# Patient Record
Sex: FEMALE | Race: BLACK OR AFRICAN AMERICAN | NOT HISPANIC OR LATINO | Employment: UNEMPLOYED | URBAN - METROPOLITAN AREA
[De-identification: names, ages, dates, MRNs, and addresses within clinical notes are randomized per-mention and may not be internally consistent; named-entity substitution may affect disease eponyms.]

---

## 2017-05-12 ENCOUNTER — HISTORICAL (OUTPATIENT)
Dept: RADIOLOGY | Facility: HOSPITAL | Age: 67
End: 2017-05-12

## 2018-07-01 ENCOUNTER — HISTORICAL (OUTPATIENT)
Dept: URGENT CARE | Facility: CLINIC | Age: 68
End: 2018-07-01

## 2018-08-17 ENCOUNTER — HISTORICAL (OUTPATIENT)
Dept: LAB | Facility: HOSPITAL | Age: 68
End: 2018-08-17

## 2018-09-05 ENCOUNTER — HISTORICAL (OUTPATIENT)
Dept: RADIOLOGY | Facility: HOSPITAL | Age: 68
End: 2018-09-05

## 2018-10-05 ENCOUNTER — HISTORICAL (OUTPATIENT)
Dept: RADIOLOGY | Facility: HOSPITAL | Age: 68
End: 2018-10-05

## 2019-10-22 ENCOUNTER — HISTORICAL (OUTPATIENT)
Dept: RADIOLOGY | Facility: HOSPITAL | Age: 69
End: 2019-10-22

## 2020-10-23 ENCOUNTER — HISTORICAL (OUTPATIENT)
Dept: RADIOLOGY | Facility: HOSPITAL | Age: 70
End: 2020-10-23

## 2021-03-15 ENCOUNTER — HISTORICAL (OUTPATIENT)
Dept: ENDOCRINOLOGY | Facility: CLINIC | Age: 71
End: 2021-03-15

## 2021-04-14 ENCOUNTER — HISTORICAL (OUTPATIENT)
Dept: RADIOLOGY | Facility: HOSPITAL | Age: 71
End: 2021-04-14

## 2021-04-28 ENCOUNTER — HISTORICAL (OUTPATIENT)
Dept: ENDOCRINOLOGY | Facility: CLINIC | Age: 71
End: 2021-04-28

## 2021-09-08 ENCOUNTER — HISTORICAL (OUTPATIENT)
Dept: ADMINISTRATIVE | Facility: HOSPITAL | Age: 71
End: 2021-09-08

## 2021-10-12 ENCOUNTER — HISTORICAL (OUTPATIENT)
Dept: ENDOCRINOLOGY | Facility: CLINIC | Age: 71
End: 2021-10-12

## 2021-10-12 LAB
T4 FREE SERPL-MCNC: 1.18 NG/DL (ref 0.7–1.48)
TSH SERPL-ACNC: 0.02 UIU/ML (ref 0.35–4.94)

## 2021-10-25 ENCOUNTER — HISTORICAL (OUTPATIENT)
Dept: RADIOLOGY | Facility: HOSPITAL | Age: 71
End: 2021-10-25

## 2021-11-09 ENCOUNTER — HISTORICAL (OUTPATIENT)
Dept: ADMINISTRATIVE | Facility: HOSPITAL | Age: 71
End: 2021-11-09

## 2021-11-09 LAB
T4 FREE SERPL-MCNC: 1.15 NG/DL (ref 0.7–1.48)
TSH SERPL-ACNC: 0.02 UIU/ML (ref 0.35–4.94)

## 2021-12-21 ENCOUNTER — HISTORICAL (OUTPATIENT)
Dept: LAB | Facility: HOSPITAL | Age: 71
End: 2021-12-21

## 2021-12-21 LAB
T3FREE SERPL-MCNC: 2.94 PG/ML (ref 1.71–3.71)
T4 FREE SERPL-MCNC: 1.26 NG/DL (ref 0.7–1.48)
TSH SERPL-ACNC: 0.05 UIU/ML (ref 0.35–4.94)

## 2022-01-13 ENCOUNTER — HISTORICAL (OUTPATIENT)
Dept: RADIOLOGY | Facility: HOSPITAL | Age: 72
End: 2022-01-13

## 2022-01-18 ENCOUNTER — HISTORICAL (OUTPATIENT)
Dept: ENDOCRINOLOGY | Facility: CLINIC | Age: 72
End: 2022-01-18

## 2022-04-07 ENCOUNTER — HISTORICAL (OUTPATIENT)
Dept: ADMINISTRATIVE | Facility: HOSPITAL | Age: 72
End: 2022-04-07
Payer: MEDICAID

## 2022-04-15 ENCOUNTER — HISTORICAL (OUTPATIENT)
Dept: ADMINISTRATIVE | Facility: HOSPITAL | Age: 72
End: 2022-04-15

## 2022-04-24 VITALS
HEIGHT: 66 IN | BODY MASS INDEX: 30.44 KG/M2 | SYSTOLIC BLOOD PRESSURE: 131 MMHG | WEIGHT: 189.38 LBS | OXYGEN SATURATION: 97 % | DIASTOLIC BLOOD PRESSURE: 73 MMHG

## 2022-04-28 ENCOUNTER — HISTORICAL (OUTPATIENT)
Dept: ADMINISTRATIVE | Facility: HOSPITAL | Age: 72
End: 2022-04-28
Payer: MEDICAID

## 2022-04-28 ENCOUNTER — HISTORICAL (OUTPATIENT)
Dept: RADIOLOGY | Facility: HOSPITAL | Age: 72
End: 2022-04-28
Payer: MEDICAID

## 2022-05-19 DIAGNOSIS — M94.0 TIETZE'S DISEASE: Primary | ICD-10-CM

## 2022-05-19 DIAGNOSIS — E78.2 MIXED HYPERLIPIDEMIA: ICD-10-CM

## 2022-05-19 DIAGNOSIS — E05.90 HYPERTHYROIDISM: ICD-10-CM

## 2022-05-19 DIAGNOSIS — I10 HYPERTENSION: ICD-10-CM

## 2022-05-20 ENCOUNTER — HOSPITAL ENCOUNTER (EMERGENCY)
Facility: HOSPITAL | Age: 72
Discharge: HOME | End: 2022-05-20
Attending: INTERNAL MEDICINE
Payer: MEDICARE

## 2022-05-20 VITALS
TEMPERATURE: 98 F | HEART RATE: 70 BPM | RESPIRATION RATE: 16 BRPM | BODY MASS INDEX: 31.32 KG/M2 | DIASTOLIC BLOOD PRESSURE: 70 MMHG | WEIGHT: 194.88 LBS | SYSTOLIC BLOOD PRESSURE: 170 MMHG | HEIGHT: 66 IN | OXYGEN SATURATION: 100 %

## 2022-05-20 DIAGNOSIS — M54.9 MID BACK PAIN ON LEFT SIDE: Primary | ICD-10-CM

## 2022-05-20 PROCEDURE — 99284 EMERGENCY DEPT VISIT MOD MDM: CPT | Mod: 25

## 2022-05-20 RX ORDER — BACLOFEN 10 MG/1
10 TABLET ORAL 3 TIMES DAILY
Qty: 30 TABLET | Refills: 0 | Status: SHIPPED | OUTPATIENT
Start: 2022-05-20 | End: 2022-05-30

## 2022-05-20 RX ORDER — METHYLPREDNISOLONE 4 MG/1
TABLET ORAL
Qty: 1 EACH | Refills: 0 | Status: SHIPPED | OUTPATIENT
Start: 2022-05-20 | End: 2024-01-29

## 2022-05-20 NOTE — DISCHARGE INSTRUCTIONS
Keep appointment for your scheduled CT scan of your chest on Wednesday of next week.    Follow up with Dr. Stark

## 2022-05-20 NOTE — ED PROVIDER NOTES
Encounter Date: 5/20/2022       History     Chief Complaint   Patient presents with    Back Pain     Left posterior lower lateral thoracic pain x 2 months. Seen by PCP and undergoing workup. Having increased pain, untolerable.     71 YO AAF in ER with complaints of left sided mid back and rib pain X 2 months. Has been getting worked up by her PCP Dr. Anaya and has a CT chest scheduled for Wednesday of next week. States her current pain medications are not really helping. She is currently taking Norco 10 mg and Aleve OTC. States muscle relaxers helped when she was prescribed them around Easter time. Getting up from a sitting position and sneezing makes the pain worse. She has had chest xray and rib xrays which are all unremarkable per pt. Denies injury/trauma, fever, chills, chest pain, SOB, abdominal pain, N/V/D, HA or dizziness. No other complaints.    The history is provided by the patient.     Review of patient's allergies indicates:  No Known Allergies  Past Medical History:   Diagnosis Date    Hypertension     Thyroid disease      History reviewed. No pertinent surgical history.  History reviewed. No pertinent family history.  Social History     Tobacco Use    Smoking status: Never Smoker    Smokeless tobacco: Never Used     Review of Systems   Constitutional: Negative for chills and fever.   HENT: Negative for congestion and sore throat.    Respiratory: Negative for cough and shortness of breath.    Cardiovascular: Negative for chest pain.   Gastrointestinal: Negative for abdominal pain, diarrhea, nausea and vomiting.   Genitourinary: Negative for dysuria.   Musculoskeletal: Positive for back pain.   Skin: Negative for rash.   Neurological: Negative for dizziness, weakness, light-headedness and headaches.   Hematological: Does not bruise/bleed easily.       Physical Exam     Initial Vitals [05/20/22 1714]   BP Pulse Resp Temp SpO2   (!) 182/72 67 16 98.1 °F (36.7 °C) 100 %      MAP       --          Physical Exam    Nursing note and vitals reviewed.  Constitutional: She appears well-developed and well-nourished. No distress.   HENT:   Head: Normocephalic and atraumatic.   Eyes: Conjunctivae are normal.   Neck: Neck supple.   Normal range of motion.  Cardiovascular: Normal rate and regular rhythm.   Pulmonary/Chest: Breath sounds normal. She exhibits tenderness (ttp left lateral chest wall and posteriorly at mid thoracic area).   Musculoskeletal:      Cervical back: Normal range of motion and neck supple.     Neurological: She is alert and oriented to person, place, and time.   Skin: Skin is warm and dry.   Psychiatric: She has a normal mood and affect.         ED Course   Procedures  Labs Reviewed - No data to display       Imaging Results    None          Medications - No data to display                     1825  VSS, NAD, pt is non-toxic or ill appearing,  treatment plan and discharge instructions including follow up discussed, pt verbalized understanding, all questions answered, pt is stable and ready for discharge  Clinical Impression:   Final diagnoses:  [M54.9] Mid back pain on left side (Primary)          ED Disposition Condition    Discharge Stable        ED Prescriptions     Medication Sig Dispense Start Date End Date Auth. Provider    baclofen (LIORESAL) 10 MG tablet Take 1 tablet (10 mg total) by mouth 3 (three) times daily. for 10 days 30 tablet 5/20/2022 5/30/2022 MARYANNE Hamilton    methylPREDNISolone (MEDROL DOSEPACK) 4 mg tablet Take as package instructs 1 each 5/20/2022  MARYANNE Hamilton        Follow-up Information     Follow up With Specialties Details Why Contact Info    Chepe Anaya Sr., MD Internal Medicine Schedule an appointment as soon as possible for a visit  for CT scan follow up [address] [facility] [address]  [phone]      [facility] - Emergency Dept Emergency Medicine In 3 days As needed, If symptoms worsen [address] [facility]  [address]  [phone]           MARYANNE Hamilton  05/20/22 1835

## 2022-05-30 ENCOUNTER — HOSPITAL ENCOUNTER (EMERGENCY)
Facility: HOSPITAL | Age: 72
Discharge: HOME | End: 2022-05-30
Attending: STUDENT IN AN ORGANIZED HEALTH CARE EDUCATION/TRAINING PROGRAM
Payer: MEDICARE

## 2022-05-30 VITALS
RESPIRATION RATE: 16 BRPM | DIASTOLIC BLOOD PRESSURE: 76 MMHG | SYSTOLIC BLOOD PRESSURE: 132 MMHG | BODY MASS INDEX: 31.43 KG/M2 | TEMPERATURE: 98 F | WEIGHT: 195.56 LBS | HEART RATE: 72 BPM | OXYGEN SATURATION: 100 % | HEIGHT: 66 IN

## 2022-05-30 DIAGNOSIS — M54.9 BACK PAIN: ICD-10-CM

## 2022-05-30 LAB
ALBUMIN SERPL-MCNC: 4 GM/DL (ref 3.4–4.8)
ALBUMIN/GLOB SERPL: 1.1 RATIO (ref 1.1–2)
ALP SERPL-CCNC: 74 UNIT/L (ref 40–150)
ALT SERPL-CCNC: 21 UNIT/L (ref 0–55)
AST SERPL-CCNC: 19 UNIT/L (ref 5–34)
BASOPHILS # BLD AUTO: 0.09 X10(3)/MCL (ref 0–0.2)
BASOPHILS NFR BLD AUTO: 0.8 %
BILIRUBIN DIRECT+TOT PNL SERPL-MCNC: 0.3 MG/DL
BUN SERPL-MCNC: 21.2 MG/DL (ref 9.8–20.1)
CALCIUM SERPL-MCNC: 10.2 MG/DL (ref 8.4–10.2)
CHLORIDE SERPL-SCNC: 105 MMOL/L (ref 98–107)
CO2 SERPL-SCNC: 31 MMOL/L (ref 23–31)
CREAT SERPL-MCNC: 1.1 MG/DL (ref 0.55–1.02)
EOSINOPHIL # BLD AUTO: 0.32 X10(3)/MCL (ref 0–0.9)
EOSINOPHIL NFR BLD AUTO: 2.9 %
ERYTHROCYTE [DISTWIDTH] IN BLOOD BY AUTOMATED COUNT: 14.7 % (ref 11.5–17)
GLOBULIN SER-MCNC: 3.7 GM/DL (ref 2.4–3.5)
GLUCOSE SERPL-MCNC: 120 MG/DL (ref 82–115)
HCT VFR BLD AUTO: 42.5 % (ref 37–47)
HGB BLD-MCNC: 13.2 GM/DL (ref 12–16)
IMM GRANULOCYTES # BLD AUTO: 0.09 X10(3)/MCL (ref 0–0.02)
IMM GRANULOCYTES NFR BLD AUTO: 0.8 % (ref 0–0.43)
LYMPHOCYTES # BLD AUTO: 3.93 X10(3)/MCL (ref 0.6–4.6)
LYMPHOCYTES NFR BLD AUTO: 35.7 %
MCH RBC QN AUTO: 27.5 PG (ref 27–31)
MCHC RBC AUTO-ENTMCNC: 31.1 MG/DL (ref 33–36)
MCV RBC AUTO: 88.5 FL (ref 80–94)
MONOCYTES # BLD AUTO: 0.96 X10(3)/MCL (ref 0.1–1.3)
MONOCYTES NFR BLD AUTO: 8.7 %
NEUTROPHILS # BLD AUTO: 5.6 X10(3)/MCL (ref 2.1–9.2)
NEUTROPHILS NFR BLD AUTO: 51.1 %
NRBC BLD AUTO-RTO: 0 %
PLATELET # BLD AUTO: 270 X10(3)/MCL (ref 130–400)
PMV BLD AUTO: 10.9 FL (ref 9.4–12.4)
POTASSIUM SERPL-SCNC: 3.8 MMOL/L (ref 3.5–5.1)
PROT SERPL-MCNC: 7.7 GM/DL (ref 5.8–7.6)
RBC # BLD AUTO: 4.8 X10(6)/MCL (ref 4.2–5.4)
SODIUM SERPL-SCNC: 144 MMOL/L (ref 136–145)
TROPONIN I SERPL-MCNC: <0.01 NG/ML (ref 0–0.04)
WBC # SPEC AUTO: 11 X10(3)/MCL (ref 4.5–11.5)

## 2022-05-30 PROCEDURE — 25000003 PHARM REV CODE 250: Performed by: PHYSICIAN ASSISTANT

## 2022-05-30 PROCEDURE — 63600175 PHARM REV CODE 636 W HCPCS: Performed by: PHYSICIAN ASSISTANT

## 2022-05-30 PROCEDURE — 96372 THER/PROPH/DIAG INJ SC/IM: CPT | Performed by: PHYSICIAN ASSISTANT

## 2022-05-30 PROCEDURE — 36415 COLL VENOUS BLD VENIPUNCTURE: CPT | Performed by: PHYSICIAN ASSISTANT

## 2022-05-30 PROCEDURE — 85025 COMPLETE CBC W/AUTO DIFF WBC: CPT | Performed by: PHYSICIAN ASSISTANT

## 2022-05-30 PROCEDURE — 80053 COMPREHEN METABOLIC PANEL: CPT | Performed by: PHYSICIAN ASSISTANT

## 2022-05-30 PROCEDURE — 93005 ELECTROCARDIOGRAM TRACING: CPT

## 2022-05-30 PROCEDURE — 99285 EMERGENCY DEPT VISIT HI MDM: CPT | Mod: 25

## 2022-05-30 PROCEDURE — 84484 ASSAY OF TROPONIN QUANT: CPT | Performed by: PHYSICIAN ASSISTANT

## 2022-05-30 RX ORDER — HYDROCODONE BITARTRATE AND ACETAMINOPHEN 5; 325 MG/1; MG/1
1 TABLET ORAL
Status: COMPLETED | OUTPATIENT
Start: 2022-05-30 | End: 2022-05-30

## 2022-05-30 RX ORDER — KETOROLAC TROMETHAMINE 30 MG/ML
15 INJECTION, SOLUTION INTRAMUSCULAR; INTRAVENOUS
Status: COMPLETED | OUTPATIENT
Start: 2022-05-30 | End: 2022-05-30

## 2022-05-30 RX ORDER — HYDROCODONE BITARTRATE AND ACETAMINOPHEN 5; 325 MG/1; MG/1
1 TABLET ORAL EVERY 8 HOURS PRN
Qty: 9 TABLET | Refills: 0 | Status: SHIPPED | OUTPATIENT
Start: 2022-05-30 | End: 2022-06-02

## 2022-05-30 RX ORDER — ACYCLOVIR 800 MG/1
800 TABLET ORAL
Qty: 50 TABLET | Refills: 0 | Status: SHIPPED | OUTPATIENT
Start: 2022-05-30 | End: 2022-06-09

## 2022-05-30 RX ADMIN — KETOROLAC TROMETHAMINE 15 MG: 30 INJECTION, SOLUTION INTRAMUSCULAR at 07:05

## 2022-05-30 RX ADMIN — HYDROCODONE BITARTRATE AND ACETAMINOPHEN 1 TABLET: 5; 325 TABLET ORAL at 06:05

## 2022-05-30 NOTE — ED PROVIDER NOTES
"Encounter Date: 5/30/2022       History     Chief Complaint   Patient presents with    Muscle Pain     Left-sided (lateral) pain (x)2 months. Also states the pain is "worse" with movement. Vss. nadn     Patient reports left lateral thoracic back pain; pt reports pain is worse with certain movements, coughing, as well as sneezing and has been present for nearly x2 months; pt reports the pain does not move and she denies sob      Chest Pain  The current episode started several weeks ago. Duration of episode(s) is 2 months. Chest pain occurs intermittently. The chest pain is unchanged. At its most intense, the chest pain is at 4/10. The chest pain is currently at 3/10. The quality of the pain is described as similar to previous episodes, aching and dull. The pain does not radiate. Exacerbated by: coughing, sneezing, palpation. Pertinent negatives for primary symptoms include no fever, no shortness of breath, no palpitations, no nausea, no dizziness and no altered mental status.   Pertinent negatives for associated symptoms include no claudication and no weakness. She tried NSAIDs and medication for the symptoms. There are no known risk factors.   Her past medical history is significant for hypertension.   Pertinent negatives for family medical history include: no PE, no stroke and no TIA.     Review of patient's allergies indicates:  No Known Allergies  Past Medical History:   Diagnosis Date    Hypertension     Thyroid disease      No past surgical history on file.  No family history on file.  Social History     Tobacco Use    Smoking status: Never Smoker    Smokeless tobacco: Never Used     Review of Systems   Constitutional: Negative for fever.   HENT: Negative for sore throat.    Eyes: Negative.    Respiratory: Negative for shortness of breath.    Cardiovascular: Negative for palpitations, claudication and leg swelling.   Gastrointestinal: Negative for nausea.   Genitourinary: Negative for dysuria. "   Musculoskeletal: Positive for back pain. Negative for neck pain.   Skin: Negative.  Negative for rash.   Neurological: Negative for dizziness and weakness.   Hematological: Does not bruise/bleed easily.   Psychiatric/Behavioral: Negative.        Physical Exam     Initial Vitals   BP Pulse Resp Temp SpO2   05/30/22 1810 05/30/22 1810 05/30/22 1810 05/30/22 1810 05/30/22 2042   (!) 169/83 76 18 98.4 °F (36.9 °C) 100 %      MAP       --                Physical Exam    Nursing note and vitals reviewed.  Constitutional: She appears well-developed and well-nourished.   HENT:   Head: Normocephalic and atraumatic.   Eyes: EOM are normal.   Neck: Neck supple.   Normal range of motion.  Cardiovascular: Normal rate and regular rhythm.   Pulmonary/Chest: Effort normal and breath sounds normal. No tachypnea.         She exhibits tenderness. She exhibits no mass, no crepitus, no deformity and no swelling.   Pain reproducible with palpation; point tenderness present; no pain anywhere else other than noted; no brusing, redness, or warmth appreciated   Abdominal: Abdomen is soft. Bowel sounds are normal.   Musculoskeletal:         General: Normal range of motion.      Cervical back: Normal range of motion and neck supple.     Neurological: She is alert and oriented to person, place, and time.   Skin: Skin is warm and dry.   Psychiatric: She has a normal mood and affect. Her behavior is normal. Judgment and thought content normal.         ED Course   Procedures  Labs Reviewed   COMPREHENSIVE METABOLIC PANEL - Abnormal; Notable for the following components:       Result Value    Glucose Level 120 (*)     Blood Urea Nitrogen 21.2 (*)     Creatinine 1.10 (*)     Protein Total 7.7 (*)     Globulin 3.7 (*)     All other components within normal limits   CBC WITH DIFFERENTIAL - Abnormal; Notable for the following components:    MCHC 31.1 (*)     IG# 0.09 (*)     IG% 0.8 (*)     All other components within normal limits   TROPONIN I -  Normal   CBC W/ AUTO DIFFERENTIAL    Narrative:     The following orders were created for panel order CBC auto differential.  Procedure                               Abnormality         Status                     ---------                               -----------         ------                     CBC with Differential[[id number]]        Abnormal            Final result                 Please view results for these tests on the individual orders.   EXTRA TUBES    Narrative:     The following orders were created for panel order EXTRA TUBES.  Procedure                               Abnormality         Status                     ---------                               -----------         ------                     Light Blue Top Hold[[id number]]                              In process                 Gold Top Hold[[id number]]                                    In process                   Please view results for these tests on the individual orders.   LIGHT BLUE TOP HOLD   GOLD TOP HOLD          Imaging Results          CT Chest Without Contrast (Final result)  Result time 05/30/22 19:51:57    Final result by Mark Siddiqui MD (05/30/22 19:51:57)                 Impression:      No acute abnormality of the chest.      Electronically signed by: Mark Siddiqui MD  Date:    05/30/2022  Time:    19:51             Narrative:    EXAMINATION:  CT CHEST WITHOUT CONTRAST    CLINICAL HISTORY:  thoracic left lateral chest/back pain; tender to palpation;    TECHNIQUE:  Axial images of the chest were obtained without IV contrast administration.  Coronal and sagittal reconstructions were provided.  Three dimensional and MIP images were obtained and evaluated.  Total DLP was 224 mGy-cm. Dose lowering technique and automated exposure control were utilized for this exam.    COMPARISON:  Chest radiograph 04/28/2022.    FINDINGS:  The airway is widely patent.  There is no mediastinal or hilar lymphadenopathy.  The heart is normal in size.  The  lung parenchyma is clear.  There is no pulmonary nodule or mass.  There is no pleural effusion.  There is no ground-glass or reticulonodular airspace opacity.  The upper abdomen demonstrates no acute abnormality.  There is no lytic or blastic osseous lesion.                                 Medications   ketorolac injection 15 mg (15 mg Intramuscular Given 5/30/22 1900)   HYDROcodone-acetaminophen 5-325 mg per tablet 1 tablet (1 tablet Oral Given 5/30/22 1859)           APC / Resident Notes:   I was not physically present during the history or exam. I was available at all times for consultation. (Zmora)        ED Course as of 05/31/22 0510   Mon May 30, 2022   1903 Patient transitioned to Paradise Riddle [AL]   1905 I, Paradise Riddle PA-C, assumed care of patient at this time. Awaiting lab results and CT scan.  [VJ]   2026 Discussed lab results, imaging, lab results, diagnosis, and treatment plan with her. At this point, since her symptoms have been for 2 months and she has been treated twice for musculoskeletal strain, I will treat for uncomplicated zoster with acyclovir. I will also give a short course of pain medication. She verbalized understanding. Strict return precautions given. Stable for discharge.  [VJ]      ED Course User Index  [AL] MARYANNE Dc  [VJ] Paradise Riddle PA-C             Clinical Impression:   Final diagnoses:  [M54.9] Back pain          ED Disposition Condition    Discharge Stable        ED Prescriptions     Medication Sig Dispense Start Date End Date Auth. Provider    HYDROcodone-acetaminophen (NORCO) 5-325 mg per tablet Take 1 tablet by mouth every 8 (eight) hours as needed for Pain. 9 tablet 5/30/2022 6/2/2022 Paradise Riddle PA-C    acyclovir (ZOVIRAX) 800 MG Tab Take 1 tablet (800 mg total) by mouth 5 (five) times daily. for 10 days 50 tablet 5/30/2022 6/9/2022 Paradise Riddle PA-C        Follow-up Information     Follow up With Specialties Details Why  Contact Info    [facility] - Emergency Dept Emergency Medicine In 3 days As needed, If symptoms worsen [address] [facility] [address] [phone]    Chepe Anaya Sr., MD Internal Medicine   [address] [address]  [phone]             Paradise Riddle PA-C  05/30/22 2031       Luis Jimenez MD  05/31/22 8257

## 2022-07-08 ENCOUNTER — TELEPHONE (OUTPATIENT)
Dept: ENDOCRINOLOGY | Facility: CLINIC | Age: 72
End: 2022-07-08
Payer: MEDICARE

## 2022-07-08 RX ORDER — METHIMAZOLE 10 MG/1
10 TABLET ORAL DAILY
COMMUNITY
Start: 2022-04-25 | End: 2022-07-19 | Stop reason: SDUPTHER

## 2022-07-08 NOTE — TELEPHONE ENCOUNTER
"Spoke with patient, she states her medication bottle from [facility] states "take Methimazole every 8 hours". I explained that we did not prescribe this medication that way and to call the pharmacy to clarify the directions, as we only want her to take her dose daily. Pt agrees and verbalizes understanding.   "

## 2022-07-08 NOTE — TELEPHONE ENCOUNTER
----- Message from Gela Juarez sent at 7/8/2022  8:36 AM CDT -----  Regarding: [facility] ENDO: Med mgmt  STOUT PT       Pt called stating that she picked up a medication from the pharmacy yesterday and she is unfamiliar with it. Luis Guerrero filled the medication and it states to take one every 8 hours. Methimazole 10 mg tablets.   Please call the patient @ [phone]

## 2022-07-11 ENCOUNTER — TELEPHONE (OUTPATIENT)
Dept: ENDOCRINOLOGY | Facility: CLINIC | Age: 72
End: 2022-07-11
Payer: MEDICARE

## 2022-07-11 DIAGNOSIS — E05.90 HYPERTHYROIDISM: Primary | ICD-10-CM

## 2022-07-11 NOTE — TELEPHONE ENCOUNTER
----- Message from Rosanna Hoffman sent at 7/11/2022  7:48 AM CDT -----  Regarding: Pt called  #DR STOUT#    Pt called for lab orders her appt is 7/19; Pt states that she will come this Wed 7/13 to do the labs.      [phone] Pt

## 2022-07-13 ENCOUNTER — LAB VISIT (OUTPATIENT)
Dept: LAB | Facility: HOSPITAL | Age: 72
End: 2022-07-13
Attending: INTERNAL MEDICINE
Payer: MEDICARE

## 2022-07-13 DIAGNOSIS — E05.90 HYPERTHYROIDISM: ICD-10-CM

## 2022-07-13 LAB
T3FREE SERPL-MCNC: 2.44 PG/ML (ref 1.57–3.91)
T4 FREE SERPL-MCNC: 0.75 NG/DL (ref 0.7–1.48)
TSH SERPL-ACNC: 5.88 UIU/ML (ref 0.35–4.94)

## 2022-07-13 PROCEDURE — 84481 FREE ASSAY (FT-3): CPT

## 2022-07-13 PROCEDURE — 84439 ASSAY OF FREE THYROXINE: CPT

## 2022-07-13 PROCEDURE — 84443 ASSAY THYROID STIM HORMONE: CPT

## 2022-07-13 PROCEDURE — 36415 COLL VENOUS BLD VENIPUNCTURE: CPT

## 2022-07-19 ENCOUNTER — OFFICE VISIT (OUTPATIENT)
Dept: ENDOCRINOLOGY | Facility: CLINIC | Age: 72
End: 2022-07-19
Payer: MEDICARE

## 2022-07-19 VITALS
HEIGHT: 62 IN | HEART RATE: 67 BPM | SYSTOLIC BLOOD PRESSURE: 122 MMHG | DIASTOLIC BLOOD PRESSURE: 79 MMHG | TEMPERATURE: 98 F | BODY MASS INDEX: 36.18 KG/M2 | WEIGHT: 196.63 LBS | RESPIRATION RATE: 18 BRPM

## 2022-07-19 DIAGNOSIS — E04.2 MULTINODULAR GOITER: ICD-10-CM

## 2022-07-19 DIAGNOSIS — E05.00 GRAVES DISEASE: Primary | ICD-10-CM

## 2022-07-19 PROCEDURE — 99214 OFFICE O/P EST MOD 30 MIN: CPT | Mod: PBBFAC

## 2022-07-19 RX ORDER — DORZOLAMIDE HYDROCHLORIDE AND TIMOLOL MALEATE 20; 5 MG/ML; MG/ML
SOLUTION/ DROPS OPHTHALMIC
COMMUNITY
Start: 2022-07-10

## 2022-07-19 RX ORDER — ASPIRIN 81 MG/1
81 TABLET ORAL DAILY
COMMUNITY

## 2022-07-19 RX ORDER — METHIMAZOLE 10 MG/1
TABLET ORAL
Qty: 45 TABLET | Refills: 1 | Status: SHIPPED | OUTPATIENT
Start: 2022-07-19 | End: 2023-02-14

## 2022-07-19 RX ORDER — PRAVASTATIN SODIUM 40 MG/1
40 TABLET ORAL DAILY
COMMUNITY
Start: 2022-07-09

## 2022-07-19 RX ORDER — METHOCARBAMOL 500 MG/1
500 TABLET, FILM COATED ORAL 2 TIMES DAILY
COMMUNITY
Start: 2022-06-15 | End: 2024-01-29

## 2022-07-19 RX ORDER — AMLODIPINE BESYLATE 5 MG/1
5 TABLET ORAL DAILY
COMMUNITY
Start: 2022-07-02

## 2022-07-19 RX ORDER — ALLOPURINOL 100 MG/1
100 TABLET ORAL DAILY
COMMUNITY
Start: 2022-07-06

## 2022-07-19 RX ORDER — AZILSARTAN KAMEDOXOMIL AND CHLORTHALIDONE 40; 12.5 MG/1; MG/1
1 TABLET ORAL DAILY
COMMUNITY
Start: 2022-07-07

## 2022-07-19 NOTE — PROGRESS NOTES
[facility]    Subjective:      This is a 71-year-old female with history of multinodular goiter s/p left hemithyroidectomy with subsequent Graves' disease who presents to [facility] endocrine clinic for a routine follow-up. Patient was last seen on January 21, 2022.  At this time, she was on methimazole 7.5 mg daily and her labs on 1/18/2021 showed TSH of 0.0581 and free T4 1.12 .Currently on methimazole 10 mg daily and labs on 7/13/22 showed TSH 5.8 and free T4 0.75. She has been compliant with methimazole and propranolol and reports no side effects      Today, denies eye bulging, dry eyes, chest pain, palpitations, unexplained weight loss and heat intolerance.        Review of Systems:  10 point ROS negative except for HPI    Objective:   Vital Signs:  Vitals:    07/19/22 0848   BP: 122/79   Pulse:    Resp:    Temp:         Body mass index is 35.51 kg/m².     General:  Well developed, well nourished, no acute respiratory distress  Head: Normocephalic, atraumatic  Eyes: PERRL, EOMI, anicteric sclera  Throat: No posterior pharyngeal erythema or exudate, no tonsillar exudate  Neck: supple, normal ROM, no JVD  CVS:  RRR, S1 and S2 normal, no murmurs, no added heart sounds, rubs, gallops, regular peripheral pulses, and no peripheral edema  Resp:  Lungs clear to auscultation bilaterally, no wheezes, rales, or rhonci  GI:  Abdomen soft, non-tender, non-distended, normoactive bowel sounds  MSK:  Full range of motion, no obvious deformities   Skin:  No rashes, ulcers, erythema  Neuro:  Alert and oriented x3, No focal neuro deficits, CNII-XII grossly intact  Psych:  Appropriate mood and affect         Laboratory:  Lab Results   Component Value Date    WBC 11.0 05/30/2022    HGB 13.2 05/30/2022    HCT 42.5 05/30/2022     05/30/2022    MCV 88.5 05/30/2022    RDW 14.7 05/30/2022     Lab Results   Component Value Date    CREATININE 1.10 (H) 05/30/2022    Lab Results   Component Value Date     05/30/2022    K 3.8  05/30/2022    CO2 31 05/30/2022    BUN 21.2 (H) 05/30/2022    CREATININE 1.10 (H) 05/30/2022    CALCIUM 10.2 05/30/2022     Lab Results   Component Value Date    TSH 5.8822 (H) 07/13/2022    [id number] 0.75 07/13/2022      Free t3- 2.44     Anemia: No results found for: IRON, TIBC, FERRITIN, LVGCPVGT99, FOLATE    Current Medications:  Current Outpatient Medications   Medication Instructions    acyclovir (ZOVIRAX) 800 mg, Oral, 5 times daily    allopurinoL (ZYLOPRIM) 100 mg, Oral, Daily    amLODIPine (NORVASC) 5 mg, Oral, Daily    aspirin (ECOTRIN) 81 mg, Oral, Daily    baclofen (LIORESAL) 10 mg, Oral, 3 times daily    dorzolamide-timolol 2-0.5% (COSOPT) 22.3-6.8 mg/mL ophthalmic solution Both Eyes    EDARBYCLOR 40-12.5 mg Tab 1 tablet, Oral, Daily    methIMAzole (TAPAZOLE) 10 mg, Oral, Daily    methocarbamoL (ROBAXIN) 500 mg, Oral, 2 times daily    methylPREDNISolone (MEDROL DOSEPACK) 4 mg tablet Take as package instructs    pravastatin (PRAVACHOL) 40 mg, Oral, Daily    propranoloL (INDERAL LA) 60 MG 24 hr capsule TAKE 1 CAPSULE BY MOUTH DAILY        Assessment and Plan:        Health Maintenance   Topic Date Due    Hepatitis C Screening  Never done    Lipid Panel  Never done    TETANUS VACCINE  Never done    DEXA Scan  Never done    Mammogram  10/25/2022          Hyperthyroidism E05.90 -Currently on 10 mg of methimazole and propanol 60 mg daily   - Last visit increased methimazole from 7.5 mg to 10 mg given her lab values, age, and risk for cardiac consequences. She is doing well with this change. Reports no adverse effects.   - Pt remains asymptomatic, and TSH has improved although slightly elevated. Most recent labs show TSH 7/13/22 5.8 and free T4 0.75. Denies dry eyes, chest pain, palpitations, weight loss, eye bulging, fever or chills   - Will decrease methimazole to 5 mg daily   - Discussed trial of holding propanolol 60 mg and restarting if needed with patient     Graves Disease   -Patient  does have confirmed Graves Disease given positive TSI antibody. She has responded well to higher dose methimazole.   - Will decrease methimazole dose today and f/u TSH at next visit.  - Discussed with her a complete thyroidectomy or radiation therapy.  - Thyroid nodule E04.1 -Repeat ultrasound of the thyroid 01/13/2022 shows a stable right thyroid nodule of 8 mm x 4 mm x 7 mm.  -We will continue to monitor        RTC in 3 months, f/u TSH           Manjula Snyder MD

## 2022-07-29 RX ORDER — METHIMAZOLE 10 MG/1
TABLET ORAL
Qty: 90 TABLET | OUTPATIENT
Start: 2022-07-29

## 2022-09-12 DIAGNOSIS — M54.31 RIGHT SIDED SCIATICA: Primary | ICD-10-CM

## 2022-09-12 DIAGNOSIS — S39.012A LUMBAR STRAIN: ICD-10-CM

## 2022-10-07 ENCOUNTER — OFFICE VISIT (OUTPATIENT)
Dept: ORTHOPEDICS | Facility: CLINIC | Age: 72
End: 2022-10-07
Payer: MEDICARE

## 2022-10-07 VITALS
WEIGHT: 196 LBS | DIASTOLIC BLOOD PRESSURE: 101 MMHG | HEIGHT: 62 IN | BODY MASS INDEX: 36.07 KG/M2 | SYSTOLIC BLOOD PRESSURE: 176 MMHG

## 2022-10-07 DIAGNOSIS — M54.9 CHRONIC BACK PAIN GREATER THAN 3 MONTHS DURATION: Primary | ICD-10-CM

## 2022-10-07 DIAGNOSIS — S39.012A LUMBAR STRAIN: ICD-10-CM

## 2022-10-07 DIAGNOSIS — M54.31 RIGHT SIDED SCIATICA: ICD-10-CM

## 2022-10-07 DIAGNOSIS — M54.16 LUMBAR RADICULITIS: ICD-10-CM

## 2022-10-07 DIAGNOSIS — G89.29 CHRONIC BACK PAIN GREATER THAN 3 MONTHS DURATION: Primary | ICD-10-CM

## 2022-10-07 PROCEDURE — 99203 OFFICE O/P NEW LOW 30 MIN: CPT | Mod: ,,, | Performed by: ANESTHESIOLOGY

## 2022-10-07 PROCEDURE — 1101F PT FALLS ASSESS-DOCD LE1/YR: CPT | Mod: CPTII,,, | Performed by: ANESTHESIOLOGY

## 2022-10-07 PROCEDURE — 3008F BODY MASS INDEX DOCD: CPT | Mod: CPTII,,, | Performed by: ANESTHESIOLOGY

## 2022-10-07 PROCEDURE — 1159F MED LIST DOCD IN RCRD: CPT | Mod: CPTII,,, | Performed by: ANESTHESIOLOGY

## 2022-10-07 PROCEDURE — 3077F PR MOST RECENT SYSTOLIC BLOOD PRESSURE >= 140 MM HG: ICD-10-PCS | Mod: CPTII,,, | Performed by: ANESTHESIOLOGY

## 2022-10-07 PROCEDURE — 3008F PR BODY MASS INDEX (BMI) DOCUMENTED: ICD-10-PCS | Mod: CPTII,,, | Performed by: ANESTHESIOLOGY

## 2022-10-07 PROCEDURE — 99203 PR OFFICE/OUTPT VISIT, NEW, LEVL III, 30-44 MIN: ICD-10-PCS | Mod: ,,, | Performed by: ANESTHESIOLOGY

## 2022-10-07 PROCEDURE — 3080F DIAST BP >= 90 MM HG: CPT | Mod: CPTII,,, | Performed by: ANESTHESIOLOGY

## 2022-10-07 PROCEDURE — 3288F PR FALLS RISK ASSESSMENT DOCUMENTED: ICD-10-PCS | Mod: CPTII,,, | Performed by: ANESTHESIOLOGY

## 2022-10-07 PROCEDURE — 1160F PR REVIEW ALL MEDS BY PRESCRIBER/CLIN PHARMACIST DOCUMENTED: ICD-10-PCS | Mod: CPTII,,, | Performed by: ANESTHESIOLOGY

## 2022-10-07 PROCEDURE — 1159F PR MEDICATION LIST DOCUMENTED IN MEDICAL RECORD: ICD-10-PCS | Mod: CPTII,,, | Performed by: ANESTHESIOLOGY

## 2022-10-07 PROCEDURE — 3288F FALL RISK ASSESSMENT DOCD: CPT | Mod: CPTII,,, | Performed by: ANESTHESIOLOGY

## 2022-10-07 PROCEDURE — 1160F RVW MEDS BY RX/DR IN RCRD: CPT | Mod: CPTII,,, | Performed by: ANESTHESIOLOGY

## 2022-10-07 PROCEDURE — 3077F SYST BP >= 140 MM HG: CPT | Mod: CPTII,,, | Performed by: ANESTHESIOLOGY

## 2022-10-07 PROCEDURE — 3080F PR MOST RECENT DIASTOLIC BLOOD PRESSURE >= 90 MM HG: ICD-10-PCS | Mod: CPTII,,, | Performed by: ANESTHESIOLOGY

## 2022-10-07 PROCEDURE — 1101F PR PT FALLS ASSESS DOC 0-1 FALLS W/OUT INJ PAST YR: ICD-10-PCS | Mod: CPTII,,, | Performed by: ANESTHESIOLOGY

## 2022-10-07 NOTE — PROGRESS NOTES
Elizabeth Conteh MD        PATIENT NAME: Meseret Mathis  : 1950  DATE: 10/7/22  MRN: [id number]      Billing Provider: Elizabeth Conteh MD  Level of Service:   Patient PCP Information       Provider PCP Type    Chepe Anaya Sr, MD General            Reason for Visit / Chief Complaint: Pain (Right sided sciatica, lumbar strain, referral from Dr. Anaya. Started 5 months ago. Improving a little. Ibuprofen with little relief. )       Update PCP  Update Chief Complaint         History of Present Illness / Problem Focused Workflow     Meseret Mathis presents to the clinic with Pain (Right sided sciatica, lumbar strain, referral from Dr. Anaya. Started 5 months ago. Improving a little. Ibuprofen with little relief. )     This is a 72-year-old female who presents to clinic today for her initial consultation as a referral from her primary care provider.  She complains of low back pain with radiation down the right lower extremity to her ankle.  She states that her pain has been present for about 5 or 6 months now.  She recalls picking up a bag of sand the week before her pain started.  She denies any radiation down the left lower extremity.  She also does not have any numbness or tingling in the lower extremities.  She currently rates her pain as 7/10 on the NRS.  There are times when she has no pain at all, such as when she is lying in bed.  The pain is really only present when she is up and doing more active things.  It gets up to 8/10 at worst.  She has been taking ibuprofen, which helps a little bit, as does a over-the-counter topical medication.    Pain    Review of Systems     Review of Systems   Musculoskeletal:  Positive for back pain, gait problem and leg pain.   All other systems reviewed and are negative.     Medical / Social / Family History     Past Medical History:   Diagnosis Date    Hyperlipidemia     Hypertension     Thyroid disease        History reviewed. No pertinent surgical  history.    Social History  Ms. Mathis  reports that she has never smoked. She has never used smokeless tobacco. She reports that she does not currently use alcohol. She reports that she does not use drugs.    Family History  Ms.'s Mathis family history is not on file.    Medications and Allergies     Medications  Outpatient Medications Marked as Taking for the 10/7/22 encounter (Office Visit) with Elizabeth Conteh MD   Medication Sig Dispense Refill    allopurinoL (ZYLOPRIM) 100 MG tablet Take 100 mg by mouth once daily.      amLODIPine (NORVASC) 5 MG tablet Take 5 mg by mouth once daily.      aspirin (ECOTRIN) 81 MG EC tablet Take 81 mg by mouth once daily.      dorzolamide-timolol 2-0.5% (COSOPT) 22.3-6.8 mg/mL ophthalmic solution Place into both eyes.      EDARBYCLOR 40-12.5 mg Tab Take 1 tablet by mouth once daily.      methIMAzole (TAPAZOLE) 10 MG Tab Take 1/2 tab po daily 45 tablet 1    methocarbamoL (ROBAXIN) 500 MG Tab Take 500 mg by mouth 2 (two) times daily.      pravastatin (PRAVACHOL) 40 MG tablet Take 40 mg by mouth once daily.         Allergies  Review of patient's allergies indicates:  No Known Allergies    Physical Examination     Vitals:    10/07/22 0812   BP: (!) 176/101     Spine Musculoskeletal Exam    Gait    Gait is normal.    Inspection    Thoracolumbar    Thoracolumbar inspection is normal.    Palpation    Thoracolumbar    Tenderness: present      Paraspinous: right and left    Right      Masses: none      Spasms: none      Crepitus: none      Muscle tone: normal    Left      Masses: none      Spasms: none      Crepitus: none      Muscle tone: normal    Range of Motion    Thoracolumbar        Thoracolumbar range of motion additional comments: Restricted range of motion in the lower back due to pain    Strength    Thoracolumbar    Thoracolumbar motor exam is normal.       Sensory    Thoracolumbar    Thoracolumbar sensation is normal.    Special Tests    Thoracolumbar      Right      SLR:  no back or leg pain      Left      SLR: no back or leg pain    General      Constitutional: appears stated age, well-developed and well-nourished    Scleral icterus: no    Labored breathing: no    Psychiatric: normal mood and affect and no acute distress    Neurological: alert and oriented x3    Skin: intact    Lymphadenopathy: none     Assessment and Plan (including Health Maintenance)      Problem List  Smart Sets  Document Outside HM   :    Plan:   Chronic back pain greater than 3 months duration  -     Ambulatory referral/consult to Physical/Occupational Therapy; Future; Expected date: 10/14/2022    Right sided sciatica  -     Ambulatory referral/consult to Pain Clinic    Lumbar strain  -     Ambulatory referral/consult to Pain Clinic    Lumbar radiculitis  -     Ambulatory referral/consult to Physical/Occupational Therapy; Future; Expected date: 10/14/2022     She is been having back pain with radiation down the right lower extremity for about 5 or 6 months now.  She has not really had any treatment other than over-the-counter pain relievers.  I am going to refer her for a course of physical therapy today.  I will plan to follow up with her upon the completion of therapy.  We discussed that if she is still having pain at that point, we will consider getting MRI of the lumbar spine for further evaluation.    Problem List Items Addressed This Visit    None  Visit Diagnoses       Chronic back pain greater than 3 months duration    -  Primary    Relevant Orders    Ambulatory referral/consult to Physical/Occupational Therapy    Right sided sciatica        Lumbar strain        Lumbar radiculitis        Relevant Orders    Ambulatory referral/consult to Physical/Occupational Therapy              Future Appointments   Date Time Provider Department Center   1/9/2023  9:15 AM MD [facility]   2/14/2023  7:40 AM [facility], [facility] ENDOCRINOLOGY [facility] ENDOCR [facility]        There are no Patient  Instructions on file for this visit.  No follow-ups on file.     Signature:  Elizabeth Conteh MD      Date of encounter: 10/7/22

## 2022-11-08 ENCOUNTER — TELEPHONE (OUTPATIENT)
Dept: ORTHOPEDICS | Facility: CLINIC | Age: 72
End: 2022-11-08
Payer: MEDICARE

## 2022-11-23 ENCOUNTER — HOSPITAL ENCOUNTER (OUTPATIENT)
Dept: RADIOLOGY | Facility: HOSPITAL | Age: 72
Discharge: HOME | End: 2022-11-23
Attending: INTERNAL MEDICINE
Payer: MEDICARE

## 2022-11-23 DIAGNOSIS — Z12.31 BREAST CANCER SCREENING BY MAMMOGRAM: ICD-10-CM

## 2022-11-23 PROCEDURE — 77067 SCR MAMMO BI INCL CAD: CPT | Mod: 26,,, | Performed by: RADIOLOGY

## 2022-11-23 PROCEDURE — 77063 MAMMO DIGITAL SCREENING BILAT WITH TOMO: ICD-10-PCS | Mod: 26,,, | Performed by: RADIOLOGY

## 2022-11-23 PROCEDURE — 77067 SCR MAMMO BI INCL CAD: CPT | Mod: TC

## 2022-11-23 PROCEDURE — 77067 MAMMO DIGITAL SCREENING BILAT WITH TOMO: ICD-10-PCS | Mod: 26,,, | Performed by: RADIOLOGY

## 2022-11-23 PROCEDURE — 77063 BREAST TOMOSYNTHESIS BI: CPT | Mod: 26,,, | Performed by: RADIOLOGY

## 2023-01-09 ENCOUNTER — OFFICE VISIT (OUTPATIENT)
Dept: PAIN MEDICINE | Facility: CLINIC | Age: 73
End: 2023-01-09
Payer: MEDICARE

## 2023-01-09 VITALS
BODY MASS INDEX: 34.73 KG/M2 | WEIGHT: 196 LBS | HEIGHT: 63 IN | SYSTOLIC BLOOD PRESSURE: 151 MMHG | DIASTOLIC BLOOD PRESSURE: 82 MMHG | HEART RATE: 73 BPM

## 2023-01-09 DIAGNOSIS — M54.9 CHRONIC BACK PAIN GREATER THAN 3 MONTHS DURATION: Primary | ICD-10-CM

## 2023-01-09 DIAGNOSIS — M54.16 LUMBAR RADICULITIS: ICD-10-CM

## 2023-01-09 DIAGNOSIS — G89.29 CHRONIC BACK PAIN GREATER THAN 3 MONTHS DURATION: Primary | ICD-10-CM

## 2023-01-09 PROCEDURE — 3077F PR MOST RECENT SYSTOLIC BLOOD PRESSURE >= 140 MM HG: ICD-10-PCS | Mod: CPTII,,, | Performed by: ANESTHESIOLOGY

## 2023-01-09 PROCEDURE — 3008F PR BODY MASS INDEX (BMI) DOCUMENTED: ICD-10-PCS | Mod: CPTII,,, | Performed by: ANESTHESIOLOGY

## 2023-01-09 PROCEDURE — 3077F SYST BP >= 140 MM HG: CPT | Mod: CPTII,,, | Performed by: ANESTHESIOLOGY

## 2023-01-09 PROCEDURE — 3008F BODY MASS INDEX DOCD: CPT | Mod: CPTII,,, | Performed by: ANESTHESIOLOGY

## 2023-01-09 PROCEDURE — 1101F PT FALLS ASSESS-DOCD LE1/YR: CPT | Mod: CPTII,,, | Performed by: ANESTHESIOLOGY

## 2023-01-09 PROCEDURE — 1160F RVW MEDS BY RX/DR IN RCRD: CPT | Mod: CPTII,,, | Performed by: ANESTHESIOLOGY

## 2023-01-09 PROCEDURE — 3288F PR FALLS RISK ASSESSMENT DOCUMENTED: ICD-10-PCS | Mod: CPTII,,, | Performed by: ANESTHESIOLOGY

## 2023-01-09 PROCEDURE — 1160F PR REVIEW ALL MEDS BY PRESCRIBER/CLIN PHARMACIST DOCUMENTED: ICD-10-PCS | Mod: CPTII,,, | Performed by: ANESTHESIOLOGY

## 2023-01-09 PROCEDURE — 3079F DIAST BP 80-89 MM HG: CPT | Mod: CPTII,,, | Performed by: ANESTHESIOLOGY

## 2023-01-09 PROCEDURE — 3079F PR MOST RECENT DIASTOLIC BLOOD PRESSURE 80-89 MM HG: ICD-10-PCS | Mod: CPTII,,, | Performed by: ANESTHESIOLOGY

## 2023-01-09 PROCEDURE — 99213 OFFICE O/P EST LOW 20 MIN: CPT | Mod: ,,, | Performed by: ANESTHESIOLOGY

## 2023-01-09 PROCEDURE — 99213 PR OFFICE/OUTPT VISIT, EST, LEVL III, 20-29 MIN: ICD-10-PCS | Mod: ,,, | Performed by: ANESTHESIOLOGY

## 2023-01-09 PROCEDURE — 1159F PR MEDICATION LIST DOCUMENTED IN MEDICAL RECORD: ICD-10-PCS | Mod: CPTII,,, | Performed by: ANESTHESIOLOGY

## 2023-01-09 PROCEDURE — 3288F FALL RISK ASSESSMENT DOCD: CPT | Mod: CPTII,,, | Performed by: ANESTHESIOLOGY

## 2023-01-09 PROCEDURE — 1159F MED LIST DOCD IN RCRD: CPT | Mod: CPTII,,, | Performed by: ANESTHESIOLOGY

## 2023-01-09 PROCEDURE — 1101F PR PT FALLS ASSESS DOC 0-1 FALLS W/OUT INJ PAST YR: ICD-10-PCS | Mod: CPTII,,, | Performed by: ANESTHESIOLOGY

## 2023-01-09 RX ORDER — GABAPENTIN 300 MG/1
300 CAPSULE ORAL 2 TIMES DAILY
Qty: 60 CAPSULE | Refills: 2 | Status: SHIPPED | OUTPATIENT
Start: 2023-01-09 | End: 2023-04-28 | Stop reason: SDUPTHER

## 2023-01-09 RX ORDER — PROPRANOLOL HYDROCHLORIDE 60 MG/1
60 CAPSULE, EXTENDED RELEASE ORAL
COMMUNITY
Start: 2021-07-14 | End: 2024-03-12

## 2023-01-09 RX ORDER — SEMAGLUTIDE 1.34 MG/ML
INJECTION, SOLUTION SUBCUTANEOUS
COMMUNITY
Start: 2023-01-05

## 2023-01-09 RX ORDER — OMEPRAZOLE 40 MG/1
40 CAPSULE, DELAYED RELEASE ORAL 2 TIMES DAILY
COMMUNITY
Start: 2022-12-22

## 2023-01-09 RX ORDER — POTASSIUM BICARBONATE 391 MG/1
10 TABLET, EFFERVESCENT ORAL
COMMUNITY
Start: 2022-09-27

## 2023-01-09 NOTE — PROGRESS NOTES
Elizabeth Conteh MD        PATIENT NAME: Meseret Mathis  : 1950  DATE: 23  MRN: [id number]      Billing Provider: Elizabeth Conteh MD  Level of Service:   Patient PCP Information       Provider PCP Type    Chepe Anaya Sr, MD General            Reason for Visit / Chief Complaint: Follow-up (Follow up visit for R Sciatica Lumbar strain. Pain is a 3/10 on worse day. Taking Ibuprofen for pain. States she has tried exercise and PT for the pain states it has helped drastically and she would like to continue. )       Update PCP  Update Chief Complaint         History of Present Illness / Problem Focused Workflow     Meseret Mathis presents to the clinic with Follow-up (Follow up visit for R Sciatica Lumbar strain. Pain is a 3/10 on worse day. Taking Ibuprofen for pain. States she has tried exercise and PT for the pain states it has helped drastically and she would like to continue. )       This is a 72-year-old female who returns to clinic today for follow up of low back pain with radiation down the right lower extremity to her ankle.  She states that her pain has been present for about 5 or 6 months now.  She recalls picking up a bag of sand the week before her pain started.  She denies any radiation down the left lower extremity.  She also does not have any numbness or tingling in the lower extremities.  She currently rates her pain as 7/10 on the NRS.  There are times when she has no pain at all, such as when she is lying in bed.  The pain is really only present when she is up and doing more active things.  It gets up to 8/10 at worst.  She has been taking ibuprofen, which helps a little bit, as does a over-the-counter topical medication.  She was last seen here for her initial consultation a few months ago, at which time I had referred her for physical therapy.  She states that it has been helping quite a bit.  She would like a new referral to go back for the new year.  She states that she does still  have some pain radiating down the right leg, particularly in the mornings.      Pain    Follow-up    Review of Systems     Review of Systems   Musculoskeletal:  Positive for back pain, gait problem and leg pain.   All other systems reviewed and are negative.     Medical / Social / Family History     Past Medical History:   Diagnosis Date    Hyperlipidemia     Hypertension     Thyroid disease        History reviewed. No pertinent surgical history.    Social History  Ms. Mathis  reports that she has never smoked. She has never used smokeless tobacco. She reports that she does not currently use alcohol. She reports that she does not use drugs.    Family History  Ms.'s Mathis family history is not on file.    Medications and Allergies     Medications  Outpatient Medications Marked as Taking for the 1/9/23 encounter (Office Visit) with Elizabeth Conteh MD   Medication Sig Dispense Refill    allopurinoL (ZYLOPRIM) 100 MG tablet Take 100 mg by mouth once daily.      amLODIPine (NORVASC) 5 MG tablet Take 5 mg by mouth once daily.      aspirin (ECOTRIN) 81 MG EC tablet Take 81 mg by mouth once daily.      dorzolamide-timolol 2-0.5% (COSOPT) 22.3-6.8 mg/mL ophthalmic solution Place into both eyes.      EDARBYCLOR 40-12.5 mg Tab Take 1 tablet by mouth once daily.      EFFER-K disintegrating tablet Take 10 mEq by mouth.      methIMAzole (TAPAZOLE) 10 MG Tab Take 1/2 tab po daily 45 tablet 1    methocarbamoL (ROBAXIN) 500 MG Tab Take 500 mg by mouth 2 (two) times daily.      methylPREDNISolone (MEDROL DOSEPACK) 4 mg tablet Take as package instructs 1 each 0    omeprazole (PRILOSEC) 40 MG capsule Take 40 mg by mouth 2 (two) times daily.      OZEMPIC 0.25 mg or 0.5 mg(2 mg/1.5 mL) pen injector Inject into the skin.      pravastatin (PRAVACHOL) 40 MG tablet Take 40 mg by mouth once daily.      propranoloL (INDERAL LA) 60 MG 24 hr capsule Take 60 mg by mouth.         Allergies  Review of patient's allergies indicates:  No Known  Allergies    Physical Examination     Vitals:    01/09/23 0932   BP: (!) 151/82   Pulse: 73     Spine Musculoskeletal Exam    Gait    Gait is normal.    Inspection    Thoracolumbar    Thoracolumbar inspection is normal.    Palpation    Thoracolumbar    Tenderness: present      Paraspinous: right and left    Right      Masses: none      Spasms: none      Crepitus: none      Muscle tone: normal    Left      Masses: none      Spasms: none      Crepitus: none      Muscle tone: normal    Range of Motion    Thoracolumbar        Thoracolumbar range of motion additional comments: Restricted range of motion in the lower back due to pain    Strength    Thoracolumbar    Thoracolumbar motor exam is normal.       Sensory    Thoracolumbar    Thoracolumbar sensation is normal.    Special Tests    Thoracolumbar      Right      SLR: no back or leg pain      Left      SLR: no back or leg pain    General      Constitutional: appears stated age, well-developed and well-nourished    Scleral icterus: no    Labored breathing: no    Psychiatric: normal mood and affect and no acute distress    Neurological: alert and oriented x3    Skin: intact    Lymphadenopathy: none     Assessment and Plan (including Health Maintenance)      Problem List  Smart Sets  Document Outside HM   :    Plan:   Chronic back pain greater than 3 months duration  -     Ambulatory referral/consult to Physical/Occupational Therapy; Future; Expected date: 01/16/2023    Lumbar radiculitis  -     Ambulatory referral/consult to Physical/Occupational Therapy; Future; Expected date: 01/16/2023    Other orders  -     gabapentin (NEURONTIN) 300 MG capsule; Take 1 capsule (300 mg total) by mouth 2 (two) times daily.  Dispense: 60 capsule; Refill: 2       I am placing a new referral today for her to continue going to physical therapy.  She is been having good results so far.  I am also going to start her on Neurontin for the neuropathic component of her pain and will titrate up  the dose as tolerated.  She is having some pain radiating down the right leg, especially in the mornings.  I will follow up with her again in 3 months.    Problem List Items Addressed This Visit    None  Visit Diagnoses       Chronic back pain greater than 3 months duration    -  Primary    Relevant Orders    Ambulatory referral/consult to Physical/Occupational Therapy    Lumbar radiculitis        Relevant Orders    Ambulatory referral/consult to Physical/Occupational Therapy              Future Appointments   Date Time Provider Department Center   2/14/2023  7:40 AM [facility]IDENTS, [facility] ENDOCRINOLOGY [facility] TERI Rodriguez    4/10/2023  9:45 AM Elizabeth Conteh MD [facility] ROSA Rodriguez [place]        There are no Patient Instructions on file for this visit.  No follow-ups on file.     Signature:  Elizabeth Conteh MD      Date of encounter: 1/9/23

## 2023-02-07 ENCOUNTER — TELEPHONE (OUTPATIENT)
Dept: PAIN MEDICINE | Facility: CLINIC | Age: 73
End: 2023-02-07
Payer: MEDICARE

## 2023-02-08 ENCOUNTER — LAB VISIT (OUTPATIENT)
Dept: LAB | Facility: HOSPITAL | Age: 73
End: 2023-02-08
Attending: INTERNAL MEDICINE
Payer: MEDICARE

## 2023-02-08 DIAGNOSIS — E05.00 GRAVES DISEASE: ICD-10-CM

## 2023-02-08 LAB — TSH SERPL-ACNC: 1.19 UIU/ML (ref 0.35–4.94)

## 2023-02-08 PROCEDURE — 36415 COLL VENOUS BLD VENIPUNCTURE: CPT

## 2023-02-08 PROCEDURE — 84443 ASSAY THYROID STIM HORMONE: CPT

## 2023-02-14 ENCOUNTER — OFFICE VISIT (OUTPATIENT)
Dept: ENDOCRINOLOGY | Facility: CLINIC | Age: 73
End: 2023-02-14
Payer: MEDICARE

## 2023-02-14 VITALS
HEIGHT: 63 IN | HEART RATE: 74 BPM | WEIGHT: 183 LBS | SYSTOLIC BLOOD PRESSURE: 112 MMHG | TEMPERATURE: 98 F | BODY MASS INDEX: 32.43 KG/M2 | DIASTOLIC BLOOD PRESSURE: 71 MMHG | RESPIRATION RATE: 15 BRPM

## 2023-02-14 DIAGNOSIS — E05.00 GRAVES DISEASE: Primary | ICD-10-CM

## 2023-02-14 DIAGNOSIS — E04.2 MULTINODULAR GOITER: ICD-10-CM

## 2023-02-14 PROCEDURE — 99214 OFFICE O/P EST MOD 30 MIN: CPT | Mod: PBBFAC

## 2023-02-14 RX ORDER — METHIMAZOLE 5 MG/1
5 TABLET ORAL DAILY
Qty: 90 TABLET | Refills: 3 | Status: SHIPPED | OUTPATIENT
Start: 2023-02-14 | End: 2023-08-18 | Stop reason: SDUPTHER

## 2023-02-14 NOTE — PROGRESS NOTES
[facility] ENDOCRINE  OUTPATIENT OFFICE VISIT NOTE    SUBJECTIVE:      HPI: Meseret Mathis is a 72 y.o. yo female w/ PMH of multinodular goiter s/p right hemithyroidectomy with subsequent Graves' disease who presents to [facility] endocrine clinic for a routine follow-up. Patient was last seen on July 19, 2022.  Patient is currently on methimazole 5 mg daily and most recent labs on 2/8/23 show a TSH of 1.186. She has been compliant with methimazole and propranolol and reports no side effects. Today, she is doing well and denies dry eyes, chest pain, palpitations, weight loss, eye bulging, fever or chills. She has not been taking  propanolol and reports no palpations or symptoms. Patient will return to endocrine clinic in 6 months for follow up.     ROS:  CONSTITUTIONAL: No weight loss, subjective fever, chills, weakness or fatigue.  HEENT: Eyes: No visual loss, blurred vision, double vision or yellow sclerae. Ears, Nose, Throat: No hearing loss, sneezing, congestion, runny nose or sore throat.  SKIN: No rash or itching.  CARDIOVASCULAR: No chest pain, chest pressure or chest discomfort. No palpitations or edema.  RESPIRATORY: No shortness of breath, cough or sputum.  GASTROINTESTINAL: No anorexia, nausea, vomiting or diarrhea. No abdominal pain or blood.  GENITOURINARY: No dysuria, hematuria, or incontinence  NEUROLOGICAL: No headache, dizziness, syncope, paralysis, ataxia, numbness or tingling in the extremities. No change in bowel or bladder control.  MUSCULOSKELETAL: No muscle, back pain, joint pain or stiffness.  HEMATOLOGIC: No anemia, bleeding or bruising.  LYMPHATICS: No enlarged nodes.  PSYCHIATRIC: No history of depression or anxiety.  ENDOCRINOLOGIC: No reports of sweating, cold or heat intolerance. No polyuria or polydipsia.  ALLERGIES: No history of asthma, hives, eczema or rhinitis.       OBJECTIVE:     Vital signs:   /71 (BP Location: Left arm, Patient Position: Sitting, BP Method: Medium (Automatic))    "Pulse 74   Temp 98.4 °F (36.9 °C)   Resp 15   Ht 5' 2.99" (1.6 m)   Wt 83 kg (182 lb 15.7 oz)   LMP  (LMP Unknown)   BMI 32.42 kg/m²      Physical Examination:  General: Well nourished w/o distress  HEENT: NC/AT; PERRLA; nasal and oral mucosa moist and clear; no sinus tenderness; no thyromegaly  Neck: Full ROM; no lymphadenopathy, small left sided thyroid nodule  Pulm: CTA bilaterally, normal work of breathing  CV: S1, S2 w/o murmurs or gallops; no edema noted  GI: Soft with normal bowel sounds in all quadrants, no masses on palpation  MSK: Full ROM of all extremities and spine w/o limitation or discomfort  Derm: No rashes, abnormal bruising, or skin lesions  Neuro: AAOx4; CN II-XII intact; motor/sensory function intact  Psych: Cooperative; appropriate mood and affect       ASSESSMENT & PLAN:     Hyperthyroidism  -Currently on 5 mg of methimazole daily, refilled today  -Repeat TSH in 6 months  -Most recent labs on 2/8/23 show TSH of 1.186  -No longer taking propanolol and does not have any palpations      Graves Disease   -Currently on 5 mg of methimazole  -Most recent labs on 2/8/23 show TSH of 1.186  -Discussed complete thyroidectomy or radiation therapy in past, patient would like to continue current therapy     Thyroid nodule  -Repeat ultrasound of the thyroid 01/13/2022 shows a stable left thyroid nodule of 8 mm x 4 mm x 7 mm.  -Plan to repeat US in 1 year  -We will continue to monitor    Return to clinic in 6 months.     Eddie Coronado, DO   [facility] Internal Medicine, PGY-3      "

## 2023-04-28 ENCOUNTER — OFFICE VISIT (OUTPATIENT)
Dept: PAIN MEDICINE | Facility: CLINIC | Age: 73
End: 2023-04-28
Payer: MEDICARE

## 2023-04-28 VITALS — WEIGHT: 182 LBS | HEIGHT: 62 IN | BODY MASS INDEX: 33.49 KG/M2

## 2023-04-28 DIAGNOSIS — M54.16 LUMBAR RADICULITIS: ICD-10-CM

## 2023-04-28 DIAGNOSIS — M54.9 CHRONIC BACK PAIN GREATER THAN 3 MONTHS DURATION: Primary | ICD-10-CM

## 2023-04-28 DIAGNOSIS — M51.36 LUMBAR DEGENERATIVE DISC DISEASE: ICD-10-CM

## 2023-04-28 DIAGNOSIS — G89.29 CHRONIC BACK PAIN GREATER THAN 3 MONTHS DURATION: Primary | ICD-10-CM

## 2023-04-28 PROCEDURE — 1159F PR MEDICATION LIST DOCUMENTED IN MEDICAL RECORD: ICD-10-PCS | Mod: CPTII,,, | Performed by: ANESTHESIOLOGY

## 2023-04-28 PROCEDURE — 1160F PR REVIEW ALL MEDS BY PRESCRIBER/CLIN PHARMACIST DOCUMENTED: ICD-10-PCS | Mod: CPTII,,, | Performed by: ANESTHESIOLOGY

## 2023-04-28 PROCEDURE — 1160F RVW MEDS BY RX/DR IN RCRD: CPT | Mod: CPTII,,, | Performed by: ANESTHESIOLOGY

## 2023-04-28 PROCEDURE — 99213 PR OFFICE/OUTPT VISIT, EST, LEVL III, 20-29 MIN: ICD-10-PCS | Mod: ,,, | Performed by: ANESTHESIOLOGY

## 2023-04-28 PROCEDURE — 3008F PR BODY MASS INDEX (BMI) DOCUMENTED: ICD-10-PCS | Mod: CPTII,,, | Performed by: ANESTHESIOLOGY

## 2023-04-28 PROCEDURE — 3008F BODY MASS INDEX DOCD: CPT | Mod: CPTII,,, | Performed by: ANESTHESIOLOGY

## 2023-04-28 PROCEDURE — 1159F MED LIST DOCD IN RCRD: CPT | Mod: CPTII,,, | Performed by: ANESTHESIOLOGY

## 2023-04-28 PROCEDURE — 99213 OFFICE O/P EST LOW 20 MIN: CPT | Mod: ,,, | Performed by: ANESTHESIOLOGY

## 2023-04-28 RX ORDER — GABAPENTIN 300 MG/1
300 CAPSULE ORAL NIGHTLY
Qty: 30 CAPSULE | Refills: 2 | Status: SHIPPED | OUTPATIENT
Start: 2023-04-28 | End: 2023-11-27 | Stop reason: SDUPTHER

## 2023-04-28 NOTE — PROGRESS NOTES
Elizabeth Conteh MD        PATIENT NAME: Meseret Mathis  : 1950  DATE: 23  MRN: [id number]      Billing Provider: Elizabeth Conteh MD  Level of Service:   Patient PCP Information       Provider PCP Type    Chepe Anaya Sr, MD General            Reason for Visit / Chief Complaint: Low-back Pain (3 mth F/u for Post op PT, states only complains of nerve pain that runs down leg, states therapy has been a big help with relief, )       Update PCP  Update Chief Complaint         History of Present Illness / Problem Focused Workflow     Meseret Mathis presents to the clinic with Low-back Pain (3 mth F/u for Post op PT, states only complains of nerve pain that runs down leg, states therapy has been a big help with relief, )       This is a 72-year-old female who returns to clinic today for follow up of low back pain with radiation down the right lower extremity to her ankle.  She states that her pain has been present for several months.  She recalls picking up a bag of sand the week before her pain started.  She denies any radiation down the left lower extremity.  She also does not have any numbness or tingling in the lower extremities.  There are times when she has no pain at all, such as when she is lying in bed.  The pain is really only present when she is up and doing more active things. She has been taking ibuprofen, which helps a little bit, as does a over-the-counter topical medication.  She is completed physical therapy, which helped quite a bit.  She still has some numbness and tingling radiating down the right lower extremity, mostly in the morning.  She only takes the gabapentin at bedtime because it makes her drowsy during the day.      Pain    Follow-up    Low-back Pain  Associated symptoms include leg pain.   Review of Systems     Review of Systems   Musculoskeletal:  Positive for back pain, gait problem and leg pain.   All other systems reviewed and are negative.     Medical / Social / Family  History     Past Medical History:   Diagnosis Date    Hyperlipidemia     Hypertension     Thyroid disease        History reviewed. No pertinent surgical history.    Social History  Ms. Mathis  reports that she has never smoked. She has never used smokeless tobacco. She reports that she does not currently use alcohol. She reports that she does not use drugs.    Family History  Ms.'s Mathis family history is not on file.    Medications and Allergies     Medications  Outpatient Medications Marked as Taking for the 4/28/23 encounter (Office Visit) with Elizabeth Conteh MD   Medication Sig Dispense Refill    allopurinoL (ZYLOPRIM) 100 MG tablet Take 100 mg by mouth once daily.      amLODIPine (NORVASC) 5 MG tablet Take 5 mg by mouth once daily.      aspirin (ECOTRIN) 81 MG EC tablet Take 81 mg by mouth once daily.      dorzolamide-timolol 2-0.5% (COSOPT) 22.3-6.8 mg/mL ophthalmic solution Place into both eyes.      EDARBYCLOR 40-12.5 mg Tab Take 1 tablet by mouth once daily.      EFFER-K disintegrating tablet Take 10 mEq by mouth.      methIMAzole (TAPAZOLE) 5 MG Tab Take 1 tablet (5 mg total) by mouth once daily. 90 tablet 3    methocarbamoL (ROBAXIN) 500 MG Tab Take 500 mg by mouth 2 (two) times daily.      methylPREDNISolone (MEDROL DOSEPACK) 4 mg tablet Take as package instructs 1 each 0    omeprazole (PRILOSEC) 40 MG capsule Take 40 mg by mouth 2 (two) times daily.      OZEMPIC 0.25 mg or 0.5 mg(2 mg/1.5 mL) pen injector Inject into the skin.      pravastatin (PRAVACHOL) 40 MG tablet Take 40 mg by mouth once daily.      propranoloL (INDERAL LA) 60 MG 24 hr capsule Take 60 mg by mouth.      [DISCONTINUED] gabapentin (NEURONTIN) 300 MG capsule Take 1 capsule (300 mg total) by mouth 2 (two) times daily. 60 capsule 2       Allergies  Review of patient's allergies indicates:  No Known Allergies    Physical Examination     There were no vitals filed for this visit.    Spine Musculoskeletal Exam    Gait    Gait is  normal.    Inspection    Thoracolumbar    Thoracolumbar inspection is normal.    Palpation    Thoracolumbar    Tenderness: present      Paraspinous: right and left    Right      Masses: none      Spasms: none      Crepitus: none      Muscle tone: normal    Left      Masses: none      Spasms: none      Crepitus: none      Muscle tone: normal    Range of Motion    Thoracolumbar        Thoracolumbar range of motion additional comments: Restricted range of motion in the lower back due to pain    Strength    Thoracolumbar    Thoracolumbar motor exam is normal.       Sensory    Thoracolumbar    Thoracolumbar sensation is normal.    Special Tests    Thoracolumbar      Right      SLR: no back or leg pain      Left      SLR: no back or leg pain    General      Constitutional: appears stated age, well-developed and well-nourished    Scleral icterus: no    Labored breathing: no    Psychiatric: normal mood and affect and no acute distress    Neurological: alert and oriented x3    Skin: intact    Lymphadenopathy: none   CV: extremities warm, well-perfused  Resp: nonlabored breathing    Assessment and Plan (including Health Maintenance)      Problem List  Smart Sets  Document Outside HM   :    Plan:   Chronic back pain greater than 3 months duration  -     gabapentin (NEURONTIN) 300 MG capsule; Take 1 capsule (300 mg total) by mouth every evening.  Dispense: 30 capsule; Refill: 2    Lumbar degenerative disc disease  -     gabapentin (NEURONTIN) 300 MG capsule; Take 1 capsule (300 mg total) by mouth every evening.  Dispense: 30 capsule; Refill: 2    Lumbar radiculitis  -     gabapentin (NEURONTIN) 300 MG capsule; Take 1 capsule (300 mg total) by mouth every evening.  Dispense: 30 capsule; Refill: 2       She completed physical therapy and got significant relief with that.  I will refill her gabapentin, which she only takes at night due to drowsiness during the day.  I will see her back in 3 months or sooner if needed.    Problem  List Items Addressed This Visit    None  Visit Diagnoses       Chronic back pain greater than 3 months duration    -  Primary    Relevant Medications    gabapentin (NEURONTIN) 300 MG capsule    Lumbar degenerative disc disease        Relevant Medications    gabapentin (NEURONTIN) 300 MG capsule    Lumbar radiculitis        Relevant Medications    gabapentin (NEURONTIN) 300 MG capsule              Future Appointments   Date Time Provider Department Center   7/28/2023  9:45 AM Elizabeth Conteh MD [facility] ROSA Rodriguez [place]   8/18/2023  8:20 AM RESIDENTS, [facility] ENDOCRINOLOGY [facility] ENDOCR Michael Griggs        There are no Patient Instructions on file for this visit.  No follow-ups on file.     Signature:  Elizabeth Conteh MD      Date of encounter: 4/28/23

## 2023-07-28 ENCOUNTER — OFFICE VISIT (OUTPATIENT)
Dept: PAIN MEDICINE | Facility: CLINIC | Age: 73
End: 2023-07-28
Payer: MEDICARE

## 2023-07-28 VITALS
TEMPERATURE: 98 F | DIASTOLIC BLOOD PRESSURE: 66 MMHG | WEIGHT: 182 LBS | SYSTOLIC BLOOD PRESSURE: 121 MMHG | BODY MASS INDEX: 33.49 KG/M2 | HEART RATE: 89 BPM | HEIGHT: 62 IN

## 2023-07-28 DIAGNOSIS — G89.29 CHRONIC BACK PAIN GREATER THAN 3 MONTHS DURATION: Primary | ICD-10-CM

## 2023-07-28 DIAGNOSIS — M54.9 CHRONIC BACK PAIN GREATER THAN 3 MONTHS DURATION: Primary | ICD-10-CM

## 2023-07-28 DIAGNOSIS — M54.16 LUMBAR RADICULITIS: ICD-10-CM

## 2023-07-28 PROCEDURE — 3008F PR BODY MASS INDEX (BMI) DOCUMENTED: ICD-10-PCS | Mod: CPTII,,, | Performed by: ANESTHESIOLOGY

## 2023-07-28 PROCEDURE — 3078F PR MOST RECENT DIASTOLIC BLOOD PRESSURE < 80 MM HG: ICD-10-PCS | Mod: CPTII,,, | Performed by: ANESTHESIOLOGY

## 2023-07-28 PROCEDURE — 99213 PR OFFICE/OUTPT VISIT, EST, LEVL III, 20-29 MIN: ICD-10-PCS | Mod: ,,, | Performed by: ANESTHESIOLOGY

## 2023-07-28 PROCEDURE — 1125F AMNT PAIN NOTED PAIN PRSNT: CPT | Mod: CPTII,,, | Performed by: ANESTHESIOLOGY

## 2023-07-28 PROCEDURE — 1160F PR REVIEW ALL MEDS BY PRESCRIBER/CLIN PHARMACIST DOCUMENTED: ICD-10-PCS | Mod: CPTII,,, | Performed by: ANESTHESIOLOGY

## 2023-07-28 PROCEDURE — 1125F PR PAIN SEVERITY QUANTIFIED, PAIN PRESENT: ICD-10-PCS | Mod: CPTII,,, | Performed by: ANESTHESIOLOGY

## 2023-07-28 PROCEDURE — 3074F PR MOST RECENT SYSTOLIC BLOOD PRESSURE < 130 MM HG: ICD-10-PCS | Mod: CPTII,,, | Performed by: ANESTHESIOLOGY

## 2023-07-28 PROCEDURE — 1159F PR MEDICATION LIST DOCUMENTED IN MEDICAL RECORD: ICD-10-PCS | Mod: CPTII,,, | Performed by: ANESTHESIOLOGY

## 2023-07-28 PROCEDURE — 3078F DIAST BP <80 MM HG: CPT | Mod: CPTII,,, | Performed by: ANESTHESIOLOGY

## 2023-07-28 PROCEDURE — 3288F FALL RISK ASSESSMENT DOCD: CPT | Mod: CPTII,,, | Performed by: ANESTHESIOLOGY

## 2023-07-28 PROCEDURE — 3288F PR FALLS RISK ASSESSMENT DOCUMENTED: ICD-10-PCS | Mod: CPTII,,, | Performed by: ANESTHESIOLOGY

## 2023-07-28 PROCEDURE — 1159F MED LIST DOCD IN RCRD: CPT | Mod: CPTII,,, | Performed by: ANESTHESIOLOGY

## 2023-07-28 PROCEDURE — 1101F PT FALLS ASSESS-DOCD LE1/YR: CPT | Mod: CPTII,,, | Performed by: ANESTHESIOLOGY

## 2023-07-28 PROCEDURE — 99213 OFFICE O/P EST LOW 20 MIN: CPT | Mod: ,,, | Performed by: ANESTHESIOLOGY

## 2023-07-28 PROCEDURE — 1160F RVW MEDS BY RX/DR IN RCRD: CPT | Mod: CPTII,,, | Performed by: ANESTHESIOLOGY

## 2023-07-28 PROCEDURE — 1101F PR PT FALLS ASSESS DOC 0-1 FALLS W/OUT INJ PAST YR: ICD-10-PCS | Mod: CPTII,,, | Performed by: ANESTHESIOLOGY

## 2023-07-28 PROCEDURE — 3074F SYST BP LT 130 MM HG: CPT | Mod: CPTII,,, | Performed by: ANESTHESIOLOGY

## 2023-07-28 PROCEDURE — 3008F BODY MASS INDEX DOCD: CPT | Mod: CPTII,,, | Performed by: ANESTHESIOLOGY

## 2023-07-28 NOTE — PROGRESS NOTES
Elizabeth Conteh MD        PATIENT NAME: Meseret Mathis  : 1950  DATE: 23  MRN: [id number]      Billing Provider: Elizabeth Conteh MD  Level of Service:   Patient PCP Information       Provider PCP Type    Chepe Anaya Sr, MD General            Reason for Visit / Chief Complaint: Back Pain (3 month f/u from 23, pt states she has radiating pain form buttocks down to foot when she first gets up in the morning, no P.T., advil for relief, pain level 2/10)       Update PCP  Update Chief Complaint         History of Present Illness / Problem Focused Workflow     Meseret Mathis presents to the clinic with Back Pain (3 month f/u from 23, pt states she has radiating pain form buttocks down to foot when she first gets up in the morning, no P.T., advil for relief, pain level 2/10)       This is a 72-year-old female who returns to clinic today for follow up of low back pain with radiation down the right lower extremity to her ankle.  She recalls picking up a bag of sand the week before her pain started.  She denies any radiation down the left lower extremity.  She also does not have any numbness or tingling in the lower extremities.  At her last appointment here, I continued her on gabapentin as previously prescribed.  She only takes it at bedtime because it makes her a little drowsy during the day.  However, she does get good relief from the medication.  She has some pain she 1st wakes up in the morning, but it goes away once she starts moving around.  She is still doing exercises and stretches at home that she learned from physical therapy.        Pain    Follow-up    Low-back Pain  Associated symptoms include leg pain.   Back Pain  Associated symptoms include leg pain.   Review of Systems     Review of Systems   Musculoskeletal:  Positive for back pain, gait problem and leg pain.   All other systems reviewed and are negative.     Medical / Social / Family History     Past Medical History:   Diagnosis  Date    Hyperlipidemia     Hypertension     Thyroid disease        History reviewed. No pertinent surgical history.    Social History  Ms. Mathis  reports that she has never smoked. She has never used smokeless tobacco. She reports that she does not currently use alcohol. She reports that she does not use drugs.    Family History  Ms.'s Mathis family history is not on file.    Medications and Allergies     Medications  Outpatient Medications Marked as Taking for the 7/28/23 encounter (Office Visit) with Elizabeth Conteh MD   Medication Sig Dispense Refill    allopurinoL (ZYLOPRIM) 100 MG tablet Take 100 mg by mouth once daily.      amLODIPine (NORVASC) 5 MG tablet Take 5 mg by mouth once daily.      aspirin (ECOTRIN) 81 MG EC tablet Take 81 mg by mouth once daily.      dorzolamide-timolol 2-0.5% (COSOPT) 22.3-6.8 mg/mL ophthalmic solution Place into both eyes.      EDARBYCLOR 40-12.5 mg Tab Take 1 tablet by mouth once daily.      EFFER-K disintegrating tablet Take 10 mEq by mouth.      gabapentin (NEURONTIN) 300 MG capsule Take 1 capsule (300 mg total) by mouth every evening. 30 capsule 2    methIMAzole (TAPAZOLE) 5 MG Tab Take 1 tablet (5 mg total) by mouth once daily. 90 tablet 3    methylPREDNISolone (MEDROL DOSEPACK) 4 mg tablet Take as package instructs 1 each 0    omeprazole (PRILOSEC) 40 MG capsule Take 40 mg by mouth 2 (two) times daily.      OZEMPIC 0.25 mg or 0.5 mg(2 mg/1.5 mL) pen injector Inject into the skin.      pravastatin (PRAVACHOL) 40 MG tablet Take 40 mg by mouth once daily.      propranoloL (INDERAL LA) 60 MG 24 hr capsule Take 60 mg by mouth.         Allergies  Review of patient's allergies indicates:  No Known Allergies    Physical Examination     Vitals:    07/28/23 0940   BP: 121/66   Pulse: 89   Temp: 98.4 °F (36.9 °C)       Spine Musculoskeletal Exam    Gait    Gait is normal.    Inspection    Thoracolumbar    Thoracolumbar inspection is normal.    Palpation    Thoracolumbar     Tenderness: present      Paraspinous: right and left    Right      Masses: none      Spasms: none      Crepitus: none      Muscle tone: normal    Left      Masses: none      Spasms: none      Crepitus: none      Muscle tone: normal    Range of Motion    Thoracolumbar        Thoracolumbar range of motion additional comments: Restricted range of motion in the lower back due to pain    Strength    Thoracolumbar    Thoracolumbar motor exam is normal.       Sensory    Thoracolumbar    Thoracolumbar sensation is normal.    Special Tests    Thoracolumbar      Right      SLR: no back or leg pain      Left      SLR: no back or leg pain    General      Constitutional: appears stated age, well-developed and well-nourished    Scleral icterus: no    Labored breathing: no    Psychiatric: normal mood and affect and no acute distress    Neurological: alert and oriented x3    Skin: intact    Lymphadenopathy: none   CV: extremities warm, well-perfused  Resp: nonlabored breathing    Assessment and Plan (including Health Maintenance)      Problem List  Smart Sets  Document Outside HM   :    Plan:   Chronic back pain greater than 3 months duration    Lumbar radiculitis       She will continue on gabapentin as previously prescribed but does not need any refills today.  I will plan to see her back again in 6 months or sooner if needed.    Problem List Items Addressed This Visit    None  Visit Diagnoses       Chronic back pain greater than 3 months duration    -  Primary    Lumbar radiculitis                  Future Appointments   Date Time Provider Department Center   8/18/2023  8:20 AM RESIDENTS, [facility] ENDOCRINOLOGY [facility] TERI Griggs   1/29/2024  9:00 AM Elizabeth Conteh MD [facility] ROSA HUTSON        There are no Patient Instructions on file for this visit.  No follow-ups on file.     Signature:  Elizabeth Conteh MD      Date of encounter: 7/28/23

## 2023-08-16 ENCOUNTER — LAB VISIT (OUTPATIENT)
Dept: LAB | Facility: HOSPITAL | Age: 73
End: 2023-08-16
Attending: STUDENT IN AN ORGANIZED HEALTH CARE EDUCATION/TRAINING PROGRAM
Payer: MEDICARE

## 2023-08-16 DIAGNOSIS — E78.2 MIXED HYPERLIPIDEMIA: ICD-10-CM

## 2023-08-16 DIAGNOSIS — E55.9 VITAMIN D DEFICIENCY: ICD-10-CM

## 2023-08-16 DIAGNOSIS — E05.00 GRAVES DISEASE: ICD-10-CM

## 2023-08-16 DIAGNOSIS — E04.2 MULTINODULAR GOITER: ICD-10-CM

## 2023-08-16 DIAGNOSIS — Z01.01 FAILED EYE SCREENING: ICD-10-CM

## 2023-08-16 DIAGNOSIS — E11.42 DIABETIC POLYNEUROPATHY: ICD-10-CM

## 2023-08-16 LAB — TSH SERPL-ACNC: 2.1 UIU/ML (ref 0.35–4.94)

## 2023-08-16 PROCEDURE — 36415 COLL VENOUS BLD VENIPUNCTURE: CPT

## 2023-08-16 PROCEDURE — 84443 ASSAY THYROID STIM HORMONE: CPT

## 2023-08-18 ENCOUNTER — OFFICE VISIT (OUTPATIENT)
Dept: ENDOCRINOLOGY | Facility: CLINIC | Age: 73
End: 2023-08-18
Payer: MEDICARE

## 2023-08-18 VITALS
BODY MASS INDEX: 32.46 KG/M2 | TEMPERATURE: 98 F | SYSTOLIC BLOOD PRESSURE: 117 MMHG | OXYGEN SATURATION: 100 % | HEART RATE: 77 BPM | HEIGHT: 62 IN | WEIGHT: 176.38 LBS | DIASTOLIC BLOOD PRESSURE: 75 MMHG | RESPIRATION RATE: 16 BRPM

## 2023-08-18 DIAGNOSIS — E05.00 GRAVES DISEASE: Primary | ICD-10-CM

## 2023-08-18 DIAGNOSIS — E04.2 MULTINODULAR GOITER: ICD-10-CM

## 2023-08-18 PROCEDURE — 99214 OFFICE O/P EST MOD 30 MIN: CPT | Mod: PBBFAC

## 2023-08-18 RX ORDER — METHIMAZOLE 5 MG/1
5 TABLET ORAL DAILY
Qty: 90 TABLET | Refills: 0 | Status: SHIPPED | OUTPATIENT
Start: 2024-02-14 | End: 2024-03-12 | Stop reason: SDUPTHER

## 2023-09-25 ENCOUNTER — LAB VISIT (OUTPATIENT)
Dept: LAB | Facility: HOSPITAL | Age: 73
End: 2023-09-25
Attending: INTERNAL MEDICINE
Payer: MEDICARE

## 2023-09-25 DIAGNOSIS — E11.42 DIABETIC POLYNEUROPATHY: ICD-10-CM

## 2023-09-25 DIAGNOSIS — E55.9 VITAMIN D DEFICIENCY: ICD-10-CM

## 2023-09-25 DIAGNOSIS — Z01.01 FAILED EYE SCREENING: Primary | ICD-10-CM

## 2023-09-25 DIAGNOSIS — E78.2 MIXED HYPERLIPIDEMIA: ICD-10-CM

## 2023-09-25 LAB
ALBUMIN SERPL-MCNC: 4 G/DL (ref 3.4–4.8)
ALBUMIN/GLOB SERPL: 1.1 RATIO (ref 1.1–2)
ALP SERPL-CCNC: 89 UNIT/L (ref 40–150)
ALT SERPL-CCNC: 10 UNIT/L (ref 0–55)
AST SERPL-CCNC: 20 UNIT/L (ref 5–34)
BILIRUB SERPL-MCNC: 0.4 MG/DL
BUN SERPL-MCNC: 18.9 MG/DL (ref 9.8–20.1)
CALCIUM SERPL-MCNC: 9.9 MG/DL (ref 8.4–10.2)
CHLORIDE SERPL-SCNC: 107 MMOL/L (ref 98–107)
CHOLEST SERPL-MCNC: 190 MG/DL
CHOLEST/HDLC SERPL: 4 {RATIO} (ref 0–5)
CO2 SERPL-SCNC: 27 MMOL/L (ref 23–31)
CREAT SERPL-MCNC: 1.07 MG/DL (ref 0.55–1.02)
DEPRECATED CALCIDIOL+CALCIFEROL SERPL-MC: 28.2 NG/ML (ref 30–80)
EST. AVERAGE GLUCOSE BLD GHB EST-MCNC: 114 MG/DL
GFR SERPLBLD CREATININE-BSD FMLA CKD-EPI: 56 MLS/MIN/1.73/M2
GLOBULIN SER-MCNC: 3.7 GM/DL (ref 2.4–3.5)
GLUCOSE SERPL-MCNC: 79 MG/DL (ref 82–115)
HBA1C MFR BLD: 5.6 %
HDLC SERPL-MCNC: 48 MG/DL (ref 35–60)
LDLC SERPL CALC-MCNC: 121 MG/DL (ref 50–140)
MICROALBUMIN UR-MCNC: 24 UG/ML
POTASSIUM SERPL-SCNC: 3.4 MMOL/L (ref 3.5–5.1)
PROT SERPL-MCNC: 7.7 GM/DL (ref 5.8–7.6)
SODIUM SERPL-SCNC: 144 MMOL/L (ref 136–145)
TRIGL SERPL-MCNC: 106 MG/DL (ref 37–140)
TSH SERPL-ACNC: 3.21 UIU/ML (ref 0.35–4.94)
URATE SERPL-MCNC: 5.5 MG/DL (ref 2.6–6)
VLDLC SERPL CALC-MCNC: 21 MG/DL

## 2023-09-25 PROCEDURE — 80061 LIPID PANEL: CPT

## 2023-09-25 PROCEDURE — 85025 COMPLETE CBC W/AUTO DIFF WBC: CPT

## 2023-09-25 PROCEDURE — 80053 COMPREHEN METABOLIC PANEL: CPT

## 2023-09-25 PROCEDURE — 83036 HEMOGLOBIN GLYCOSYLATED A1C: CPT

## 2023-09-25 PROCEDURE — 84550 ASSAY OF BLOOD/URIC ACID: CPT

## 2023-09-25 PROCEDURE — 36415 COLL VENOUS BLD VENIPUNCTURE: CPT

## 2023-09-25 PROCEDURE — 82043 UR ALBUMIN QUANTITATIVE: CPT

## 2023-09-25 PROCEDURE — 82306 VITAMIN D 25 HYDROXY: CPT

## 2023-09-25 PROCEDURE — 84443 ASSAY THYROID STIM HORMONE: CPT

## 2023-09-26 LAB
BASOPHILS # BLD AUTO: 0.07 X10(3)/MCL
BASOPHILS NFR BLD AUTO: 0.8 %
EOSINOPHIL # BLD AUTO: 0.3 X10(3)/MCL (ref 0–0.9)
EOSINOPHIL NFR BLD AUTO: 3.5 %
ERYTHROCYTE [DISTWIDTH] IN BLOOD BY AUTOMATED COUNT: 14.6 % (ref 11.5–17)
HCT VFR BLD AUTO: 41.2 % (ref 37–47)
HGB BLD-MCNC: 12.8 G/DL (ref 12–16)
IMM GRANULOCYTES # BLD AUTO: 0.02 X10(3)/MCL (ref 0–0.04)
IMM GRANULOCYTES NFR BLD AUTO: 0.2 %
LYMPHOCYTES # BLD AUTO: 2.84 X10(3)/MCL (ref 0.6–4.6)
LYMPHOCYTES NFR BLD AUTO: 32.8 %
MCH RBC QN AUTO: 27.1 PG (ref 27–31)
MCHC RBC AUTO-ENTMCNC: 31.1 G/DL (ref 33–36)
MCV RBC AUTO: 87.1 FL (ref 80–94)
MONOCYTES # BLD AUTO: 0.72 X10(3)/MCL (ref 0.1–1.3)
MONOCYTES NFR BLD AUTO: 8.3 %
NEUTROPHILS # BLD AUTO: 4.71 X10(3)/MCL (ref 2.1–9.2)
NEUTROPHILS NFR BLD AUTO: 54.4 %
NRBC BLD AUTO-RTO: 0 %
PLATELET # BLD AUTO: 271 X10(3)/MCL (ref 130–400)
PMV BLD AUTO: 11.5 FL (ref 7.4–10.4)
RBC # BLD AUTO: 4.73 X10(6)/MCL (ref 4.2–5.4)
WBC # SPEC AUTO: 8.66 X10(3)/MCL (ref 4.5–11.5)

## 2023-11-27 DIAGNOSIS — M54.9 CHRONIC BACK PAIN GREATER THAN 3 MONTHS DURATION: ICD-10-CM

## 2023-11-27 DIAGNOSIS — M54.16 LUMBAR RADICULITIS: ICD-10-CM

## 2023-11-27 DIAGNOSIS — M51.36 LUMBAR DEGENERATIVE DISC DISEASE: ICD-10-CM

## 2023-11-27 DIAGNOSIS — G89.29 CHRONIC BACK PAIN GREATER THAN 3 MONTHS DURATION: ICD-10-CM

## 2023-11-29 RX ORDER — GABAPENTIN 300 MG/1
300 CAPSULE ORAL NIGHTLY
Qty: 30 CAPSULE | Refills: 2 | Status: SHIPPED | OUTPATIENT
Start: 2023-11-29 | End: 2024-03-18

## 2023-12-01 ENCOUNTER — HOSPITAL ENCOUNTER (OUTPATIENT)
Dept: RADIOLOGY | Facility: HOSPITAL | Age: 73
Discharge: HOME | End: 2023-12-01
Attending: STUDENT IN AN ORGANIZED HEALTH CARE EDUCATION/TRAINING PROGRAM
Payer: MEDICARE

## 2023-12-01 DIAGNOSIS — E05.00 GRAVES DISEASE: ICD-10-CM

## 2023-12-01 PROCEDURE — 76536 US EXAM OF HEAD AND NECK: CPT | Mod: TC

## 2023-12-04 ENCOUNTER — TELEPHONE (OUTPATIENT)
Dept: PAIN MEDICINE | Facility: CLINIC | Age: 73
End: 2023-12-04
Payer: MEDICARE

## 2023-12-04 ENCOUNTER — TELEPHONE (OUTPATIENT)
Dept: ENDOCRINOLOGY | Facility: CLINIC | Age: 73
End: 2023-12-04
Payer: MEDICARE

## 2023-12-04 NOTE — TELEPHONE ENCOUNTER
Darcy from [facility] contacting office stating pt got a rx of lyrica from Dr Anders which was picked up in Oct 90 days supply, and we prescribe her gabapentin. Darcy also spoke to a nurse at Dr Stark office they were also unaware she was getting gabapentin from you.   Spoke with pt she states she has stopped taking lyrica, wants to continue with gabapentin Notified Darcy OK to fill gabapentin.   Dr Conteh this information is for record. Pharmacy voided lyrica and filled gabapentin.

## 2023-12-04 NOTE — TELEPHONE ENCOUNTER
U/s was done earlier than prior to f/u.  Didn't show any nodules needing FNA, can further discuss at f/u.

## 2023-12-06 ENCOUNTER — HOSPITAL ENCOUNTER (OUTPATIENT)
Dept: RADIOLOGY | Facility: HOSPITAL | Age: 73
Discharge: HOME | End: 2023-12-06
Attending: INTERNAL MEDICINE
Payer: MEDICARE

## 2023-12-06 DIAGNOSIS — Z12.31 SCREENING MAMMOGRAM FOR HIGH-RISK PATIENT: ICD-10-CM

## 2023-12-06 PROCEDURE — 77067 MAMMO DIGITAL SCREENING BILAT WITH TOMO: ICD-10-PCS | Mod: 26,,, | Performed by: RADIOLOGY

## 2023-12-06 PROCEDURE — 77067 SCR MAMMO BI INCL CAD: CPT | Mod: TC

## 2023-12-06 PROCEDURE — 77067 SCR MAMMO BI INCL CAD: CPT | Mod: 26,,, | Performed by: RADIOLOGY

## 2023-12-06 PROCEDURE — 77063 BREAST TOMOSYNTHESIS BI: CPT | Mod: 26,,, | Performed by: RADIOLOGY

## 2023-12-06 PROCEDURE — 77063 MAMMO DIGITAL SCREENING BILAT WITH TOMO: ICD-10-PCS | Mod: 26,,, | Performed by: RADIOLOGY

## 2024-01-29 ENCOUNTER — OFFICE VISIT (OUTPATIENT)
Dept: PAIN MEDICINE | Facility: CLINIC | Age: 74
End: 2024-01-29
Payer: MEDICARE

## 2024-01-29 VITALS
BODY MASS INDEX: 32.39 KG/M2 | HEART RATE: 84 BPM | WEIGHT: 176 LBS | HEIGHT: 62 IN | DIASTOLIC BLOOD PRESSURE: 53 MMHG | SYSTOLIC BLOOD PRESSURE: 111 MMHG

## 2024-01-29 DIAGNOSIS — M54.9 CHRONIC BACK PAIN GREATER THAN 3 MONTHS DURATION: Primary | ICD-10-CM

## 2024-01-29 DIAGNOSIS — G89.29 CHRONIC BACK PAIN GREATER THAN 3 MONTHS DURATION: Primary | ICD-10-CM

## 2024-01-29 DIAGNOSIS — M54.16 LUMBAR RADICULITIS: ICD-10-CM

## 2024-01-29 PROCEDURE — 3074F SYST BP LT 130 MM HG: CPT | Mod: CPTII,,, | Performed by: ANESTHESIOLOGY

## 2024-01-29 PROCEDURE — 1159F MED LIST DOCD IN RCRD: CPT | Mod: CPTII,,, | Performed by: ANESTHESIOLOGY

## 2024-01-29 PROCEDURE — 1101F PT FALLS ASSESS-DOCD LE1/YR: CPT | Mod: CPTII,,, | Performed by: ANESTHESIOLOGY

## 2024-01-29 PROCEDURE — 3008F BODY MASS INDEX DOCD: CPT | Mod: CPTII,,, | Performed by: ANESTHESIOLOGY

## 2024-01-29 PROCEDURE — 1125F AMNT PAIN NOTED PAIN PRSNT: CPT | Mod: CPTII,,, | Performed by: ANESTHESIOLOGY

## 2024-01-29 PROCEDURE — 3288F FALL RISK ASSESSMENT DOCD: CPT | Mod: CPTII,,, | Performed by: ANESTHESIOLOGY

## 2024-01-29 PROCEDURE — 1160F RVW MEDS BY RX/DR IN RCRD: CPT | Mod: CPTII,,, | Performed by: ANESTHESIOLOGY

## 2024-01-29 PROCEDURE — 3078F DIAST BP <80 MM HG: CPT | Mod: CPTII,,, | Performed by: ANESTHESIOLOGY

## 2024-01-29 PROCEDURE — 99213 OFFICE O/P EST LOW 20 MIN: CPT | Mod: ,,, | Performed by: ANESTHESIOLOGY

## 2024-01-29 RX ORDER — CETIRIZINE HYDROCHLORIDE 10 MG/1
10 TABLET ORAL
COMMUNITY
Start: 2023-09-25

## 2024-01-29 RX ORDER — LATANOPROST 50 UG/ML
SOLUTION/ DROPS OPHTHALMIC
COMMUNITY
Start: 2023-12-19

## 2024-01-29 RX ORDER — PREGABALIN 100 MG/1
100 CAPSULE ORAL 2 TIMES DAILY
COMMUNITY
Start: 2023-09-25

## 2024-01-29 NOTE — PROGRESS NOTES
Elizabeth Conteh MD        PATIENT NAME: Meseret Mathis  : 1950  DATE: 24  MRN: [id number]      Billing Provider: Elizabeth Conteh MD  Level of Service:   Patient PCP Information       Provider PCP Type    Chepe Anaya Sr, MD General            Reason for Visit / Chief Complaint: Low-back Pain (6 mos f/u lower back pain/sciatica pain, C/O pain level 7,not taking pain meds.States feels like pain has gotten better.)       Update PCP  Update Chief Complaint         History of Present Illness / Problem Focused Workflow     Meseret Mathis presents to the clinic with Low-back Pain (6 mos f/u lower back pain/sciatica pain, C/O pain level 7,not taking pain meds.States feels like pain has gotten better.)       This is a 73-year-old female who returns to clinic today for follow up of low back pain with radiation down the right lower extremity to her ankle.  She recalls picking up a bag of sand the week before her pain started.  She denies any radiation down the left lower extremity.  She also does not have any numbness or tingling in the lower extremities.  At her last appointment here, I continued her on gabapentin as previously prescribed.  She only takes it at bedtime because it makes her a little drowsy during the day.  However, she does get good relief from the medication.  She has some pain she 1st wakes up in the morning, but it goes away once she starts moving around.  She is still doing exercises and stretches at home that she learned from physical therapy.  Overall, she feels like her pain has gotten a little better since she was last seen here.  She currently rates pain as 2/10 on the NRS.        Pain    Follow-up    Low-back Pain  Associated symptoms include leg pain.   Back Pain  Associated symptoms include leg pain.     Review of Systems     Review of Systems   Musculoskeletal:  Positive for back pain, gait problem and leg pain.   All other systems reviewed and are negative.       Medical / Social  / Family History     Past Medical History:   Diagnosis Date    Hyperlipidemia     Hypertension     Thyroid disease        History reviewed. No pertinent surgical history.    Social History  Ms. Mathis  reports that she has never smoked. She has never used smokeless tobacco. She reports that she does not currently use alcohol. She reports that she does not use drugs.    Family History  Ms.'s Mathis family history is not on file.    Medications and Allergies     Medications  Outpatient Medications Marked as Taking for the 1/29/24 encounter (Office Visit) with Elizabeth Conteh MD   Medication Sig Dispense Refill    allopurinoL (ZYLOPRIM) 100 MG tablet Take 100 mg by mouth once daily.      amLODIPine (NORVASC) 5 MG tablet Take 5 mg by mouth once daily.      aspirin (ECOTRIN) 81 MG EC tablet Take 81 mg by mouth once daily.      cetirizine (ZYRTEC) 10 MG tablet Take 10 mg by mouth.      dorzolamide-timolol 2-0.5% (COSOPT) 22.3-6.8 mg/mL ophthalmic solution Place into both eyes.      EDARBYCLOR 40-12.5 mg Tab Take 1 tablet by mouth once daily.      EFFER-K disintegrating tablet Take 10 mEq by mouth.      gabapentin (NEURONTIN) 300 MG capsule Take 1 capsule (300 mg total) by mouth every evening. 30 capsule 2    latanoprost 0.005 % ophthalmic solution Place into both eyes.      [START ON 2/14/2024] methIMAzole (TAPAZOLE) 5 MG Tab Take 1 tablet (5 mg total) by mouth once daily. 90 tablet 0    omeprazole (PRILOSEC) 40 MG capsule Take 40 mg by mouth 2 (two) times daily.      OZEMPIC 0.25 mg or 0.5 mg(2 mg/1.5 mL) pen injector Inject into the skin.      pravastatin (PRAVACHOL) 40 MG tablet Take 40 mg by mouth once daily.      pregabalin (LYRICA) 100 MG capsule Take 100 mg by mouth 2 (two) times daily.      propranoloL (INDERAL LA) 60 MG 24 hr capsule Take 60 mg by mouth.      [DISCONTINUED] methocarbamoL (ROBAXIN) 500 MG Tab Take 500 mg by mouth 2 (two) times daily.      [DISCONTINUED] methylPREDNISolone (MEDROL DOSEPACK) 4  mg tablet Take as package instructs 1 each 0       Allergies  Review of patient's allergies indicates:  No Known Allergies    Physical Examination     Vitals:    01/29/24 0929   BP: (!) 111/53   Pulse: 84       Spine Musculoskeletal Exam    Gait    Gait is normal.    Inspection    Thoracolumbar    Thoracolumbar inspection is normal.    Palpation    Thoracolumbar    Tenderness: present      Paraspinous: right and left    Right      Masses: none      Spasms: none      Crepitus: none      Muscle tone: normal    Left      Masses: none      Spasms: none      Crepitus: none      Muscle tone: normal    Range of Motion    Thoracolumbar        Thoracolumbar range of motion additional comments: Restricted range of motion in the lower back due to pain    Strength    Thoracolumbar    Thoracolumbar motor exam is normal.       Sensory    Thoracolumbar    Thoracolumbar sensation is normal.    Special Tests    Thoracolumbar      Right      SLR: no back or leg pain      Left      SLR: no back or leg pain    General      Constitutional: appears stated age, well-developed and well-nourished    Scleral icterus: no    Labored breathing: no    Psychiatric: normal mood and affect and no acute distress    Neurological: alert and oriented x3    Skin: intact    Lymphadenopathy: none     CV: extremities warm, well-perfused  Resp: nonlabored breathing    Assessment and Plan (including Health Maintenance)      Problem List  Smart Sets  Document Outside HM   :    Plan:   Chronic back pain greater than 3 months duration    Lumbar radiculitis      She is doing pretty well at this time and will continue taking gabapentin at bedtime.  She does not need any refills today.  I will plan to follow up with her again in 6 months or sooner if needed.    Problem List Items Addressed This Visit       Chronic back pain greater than 3 months duration - Primary    Lumbar radiculitis           Future Appointments   Date Time Provider Department Center    3/12/2024  7:40 AM [facility] Grig[facility]   7/29/2024  9:00 AM Elizabeth Conteh [place] [facility] [place]        There are no Patient Instructions on file for this visit.  No follow-ups on file.     Signature:  Elizabeth Conteh MD      Date of encounter: 1/29/24

## 2024-03-06 ENCOUNTER — LAB VISIT (OUTPATIENT)
Dept: LAB | Facility: HOSPITAL | Age: 74
End: 2024-03-06
Attending: STUDENT IN AN ORGANIZED HEALTH CARE EDUCATION/TRAINING PROGRAM
Payer: MEDICARE

## 2024-03-06 DIAGNOSIS — E05.00 GRAVES DISEASE: ICD-10-CM

## 2024-03-06 LAB
ALBUMIN SERPL-MCNC: 4 G/DL (ref 3.4–4.8)
ALBUMIN/GLOB SERPL: 1.1 RATIO (ref 1.1–2)
ALP SERPL-CCNC: 98 UNIT/L (ref 40–150)
ALT SERPL-CCNC: 12 UNIT/L (ref 0–55)
AST SERPL-CCNC: 18 UNIT/L (ref 5–34)
BASOPHILS # BLD AUTO: 0.08 X10(3)/MCL
BASOPHILS NFR BLD AUTO: 0.9 %
BILIRUB SERPL-MCNC: 0.3 MG/DL
BUN SERPL-MCNC: 21.1 MG/DL (ref 9.8–20.1)
CALCIUM SERPL-MCNC: 9.6 MG/DL (ref 8.4–10.2)
CHLORIDE SERPL-SCNC: 108 MMOL/L (ref 98–107)
CO2 SERPL-SCNC: 25 MMOL/L (ref 23–31)
CREAT SERPL-MCNC: 1.12 MG/DL (ref 0.55–1.02)
EOSINOPHIL # BLD AUTO: 0.23 X10(3)/MCL (ref 0–0.9)
EOSINOPHIL NFR BLD AUTO: 2.7 %
ERYTHROCYTE [DISTWIDTH] IN BLOOD BY AUTOMATED COUNT: 14.5 % (ref 11.5–17)
GFR SERPLBLD CREATININE-BSD FMLA CKD-EPI: 53 MLS/MIN/1.73/M2
GLOBULIN SER-MCNC: 3.8 GM/DL (ref 2.4–3.5)
GLUCOSE SERPL-MCNC: 82 MG/DL (ref 82–115)
HCT VFR BLD AUTO: 40 % (ref 37–47)
HGB BLD-MCNC: 12.9 G/DL (ref 12–16)
IMM GRANULOCYTES # BLD AUTO: 0.03 X10(3)/MCL (ref 0–0.04)
IMM GRANULOCYTES NFR BLD AUTO: 0.4 %
LYMPHOCYTES # BLD AUTO: 2.7 X10(3)/MCL (ref 0.6–4.6)
LYMPHOCYTES NFR BLD AUTO: 31.7 %
MCH RBC QN AUTO: 27.6 PG (ref 27–31)
MCHC RBC AUTO-ENTMCNC: 32.3 G/DL (ref 33–36)
MCV RBC AUTO: 85.7 FL (ref 80–94)
MONOCYTES # BLD AUTO: 0.82 X10(3)/MCL (ref 0.1–1.3)
MONOCYTES NFR BLD AUTO: 9.6 %
NEUTROPHILS # BLD AUTO: 4.67 X10(3)/MCL (ref 2.1–9.2)
NEUTROPHILS NFR BLD AUTO: 54.7 %
NRBC BLD AUTO-RTO: 0 %
PLATELET # BLD AUTO: 264 X10(3)/MCL (ref 130–400)
PMV BLD AUTO: 10.5 FL (ref 7.4–10.4)
POTASSIUM SERPL-SCNC: 3.5 MMOL/L (ref 3.5–5.1)
PROT SERPL-MCNC: 7.8 GM/DL (ref 5.8–7.6)
RBC # BLD AUTO: 4.67 X10(6)/MCL (ref 4.2–5.4)
SODIUM SERPL-SCNC: 142 MMOL/L (ref 136–145)
TSH SERPL-ACNC: 3.33 UIU/ML (ref 0.35–4.94)
WBC # SPEC AUTO: 8.53 X10(3)/MCL (ref 4.5–11.5)

## 2024-03-06 PROCEDURE — 80053 COMPREHEN METABOLIC PANEL: CPT

## 2024-03-06 PROCEDURE — 36415 COLL VENOUS BLD VENIPUNCTURE: CPT

## 2024-03-06 PROCEDURE — 84443 ASSAY THYROID STIM HORMONE: CPT

## 2024-03-06 PROCEDURE — 85025 COMPLETE CBC W/AUTO DIFF WBC: CPT

## 2024-03-12 ENCOUNTER — OFFICE VISIT (OUTPATIENT)
Dept: ENDOCRINOLOGY | Facility: CLINIC | Age: 74
End: 2024-03-12
Payer: MEDICARE

## 2024-03-12 VITALS
WEIGHT: 175 LBS | HEART RATE: 71 BPM | DIASTOLIC BLOOD PRESSURE: 74 MMHG | HEIGHT: 62 IN | BODY MASS INDEX: 32.2 KG/M2 | TEMPERATURE: 98 F | RESPIRATION RATE: 15 BRPM | SYSTOLIC BLOOD PRESSURE: 114 MMHG

## 2024-03-12 DIAGNOSIS — E05.00 GRAVES DISEASE: Primary | ICD-10-CM

## 2024-03-12 DIAGNOSIS — E04.2 MULTINODULAR GOITER: ICD-10-CM

## 2024-03-12 PROCEDURE — 99213 OFFICE O/P EST LOW 20 MIN: CPT | Mod: PBBFAC

## 2024-03-12 RX ORDER — METHIMAZOLE 5 MG/1
5 TABLET ORAL DAILY
Qty: 90 TABLET | Refills: 1 | Status: SHIPPED | OUTPATIENT
Start: 2024-03-12 | End: 2024-09-08

## 2024-03-12 NOTE — PROGRESS NOTES
"[facility]    Chief Complaint:      Follow-up Graves disease    Subjective:     HPI:  Meseret Mathis is a 74 y.o. female who presents to Endocrinology clinic today for  Graves disease   She was last seen in Endocrinology clinic on 8/18/2023.  TSH prior to this visit within normal limits, patient continues on 5 mg of methimazole daily and is no longer on propranolol. She denies any chest pain, palpitations, changes in weight. Patient continues to state she is still no longer interested in surgical intervention nor is she interested in radioactive iodine ablation. She has no acute complaints today and endorses compliance with medication therapy.     Review of Systems  A comprehensive 12 point review of systems was completed.  Please see above for pertinent positives and negatives.     Objective:   Last 24 Hour Vital Signs:  Vitals  BP: 114/74  Temp: 98 °F (36.7 °C)  Pulse: 71  Resp: 15  Height: 5' 2" (157.5 cm)  Weight: 79.4 kg (175 lb)    Physical Examination:  General: well developed; pleasant and cooperative  HEENT: Normocephalic, atraumatic. Face symmetric.  Cardiovascular: regular rate, well perfused  Pulmonary: Bilateral symmetric chest rise. Non-labored  Skin:  Exposed skin is warm & dry.  Extremities: No clubbing, cyanosis or edema.  Neuro:   Patient moves all extremities equally. No signs of peripheral neurological deficit      Assessment & Plan:     Graves Disease s/p right butch thyroidectomy  Thyroid nodules  -continue 5 mg of methimazole  -Discussed complete thyroidectomy/radiation therapy in the past, patient would like to continue current medical therapy at this time.    -thyroid ultrasound performed 12/1/2023 with some nodularities although none requiring further surveillance or biopsy.   -TSH stable at 3.332 as of 3/6/2024  -patient would like to continue follow-up with PCP, recommend repeat TSH every 6-12 months and a repeat thyroid US in 2-3 years.  Would also recommend continued " monitoring of CBC and CMP for monitoring of medication side effects including bone marrow insufficiency and elevated LFT's     Follow-ups  -Follow-up PRN    Kimberly Hussein MD  [facility], PGY-2

## 2024-03-16 DIAGNOSIS — M51.36 LUMBAR DEGENERATIVE DISC DISEASE: ICD-10-CM

## 2024-03-16 DIAGNOSIS — M54.16 LUMBAR RADICULITIS: ICD-10-CM

## 2024-03-16 DIAGNOSIS — G89.29 CHRONIC BACK PAIN GREATER THAN 3 MONTHS DURATION: ICD-10-CM

## 2024-03-16 DIAGNOSIS — M54.9 CHRONIC BACK PAIN GREATER THAN 3 MONTHS DURATION: ICD-10-CM

## 2024-03-18 RX ORDER — GABAPENTIN 300 MG/1
300 CAPSULE ORAL NIGHTLY
Qty: 30 CAPSULE | Refills: 2 | Status: SHIPPED | OUTPATIENT
Start: 2024-03-18 | End: 2024-06-13

## 2024-04-01 ENCOUNTER — LAB VISIT (OUTPATIENT)
Dept: LAB | Facility: HOSPITAL | Age: 74
End: 2024-04-01
Attending: INTERNAL MEDICINE
Payer: MEDICARE

## 2024-04-01 DIAGNOSIS — E07.9 DISEASE OF THYROID GLAND: ICD-10-CM

## 2024-04-01 DIAGNOSIS — E11.42 DIABETIC POLYNEUROPATHY: Primary | ICD-10-CM

## 2024-04-01 LAB
EST. AVERAGE GLUCOSE BLD GHB EST-MCNC: 114 MG/DL
HBA1C MFR BLD: 5.6 %
T4 FREE SERPL-MCNC: 0.81 NG/DL (ref 0.7–1.48)
TSH SERPL-ACNC: 5.32 UIU/ML (ref 0.35–4.94)

## 2024-04-01 PROCEDURE — 84439 ASSAY OF FREE THYROXINE: CPT

## 2024-04-01 PROCEDURE — 84443 ASSAY THYROID STIM HORMONE: CPT

## 2024-04-01 PROCEDURE — 36415 COLL VENOUS BLD VENIPUNCTURE: CPT

## 2024-04-01 PROCEDURE — 83036 HEMOGLOBIN GLYCOSYLATED A1C: CPT

## 2024-04-24 DIAGNOSIS — G56.03 BILATERAL CARPAL TUNNEL SYNDROME: Primary | ICD-10-CM

## 2024-05-16 ENCOUNTER — HOSPITAL ENCOUNTER (OUTPATIENT)
Dept: RADIOLOGY | Facility: CLINIC | Age: 74
Discharge: HOME | End: 2024-05-16
Attending: ORTHOPAEDIC SURGERY
Payer: MEDICARE

## 2024-05-16 ENCOUNTER — OFFICE VISIT (OUTPATIENT)
Dept: ORTHOPEDICS | Facility: CLINIC | Age: 74
End: 2024-05-16
Payer: MEDICARE

## 2024-05-16 VITALS
SYSTOLIC BLOOD PRESSURE: 118 MMHG | HEART RATE: 62 BPM | WEIGHT: 175.19 LBS | HEIGHT: 62 IN | BODY MASS INDEX: 32.24 KG/M2 | DIASTOLIC BLOOD PRESSURE: 65 MMHG

## 2024-05-16 DIAGNOSIS — M25.531 BILATERAL WRIST PAIN: ICD-10-CM

## 2024-05-16 DIAGNOSIS — M25.532 BILATERAL WRIST PAIN: ICD-10-CM

## 2024-05-16 DIAGNOSIS — M25.532 BILATERAL WRIST PAIN: Primary | ICD-10-CM

## 2024-05-16 DIAGNOSIS — M25.531 BILATERAL WRIST PAIN: Primary | ICD-10-CM

## 2024-05-16 DIAGNOSIS — G56.03 BILATERAL CARPAL TUNNEL SYNDROME: ICD-10-CM

## 2024-05-16 PROCEDURE — 3044F HG A1C LEVEL LT 7.0%: CPT | Mod: CPTII,,, | Performed by: ORTHOPAEDIC SURGERY

## 2024-05-16 PROCEDURE — 1160F RVW MEDS BY RX/DR IN RCRD: CPT | Mod: CPTII,,, | Performed by: ORTHOPAEDIC SURGERY

## 2024-05-16 PROCEDURE — 73110 X-RAY EXAM OF WRIST: CPT | Mod: LT,,, | Performed by: ORTHOPAEDIC SURGERY

## 2024-05-16 PROCEDURE — 3074F SYST BP LT 130 MM HG: CPT | Mod: CPTII,,, | Performed by: ORTHOPAEDIC SURGERY

## 2024-05-16 PROCEDURE — 99204 OFFICE O/P NEW MOD 45 MIN: CPT | Mod: ,,, | Performed by: ORTHOPAEDIC SURGERY

## 2024-05-16 PROCEDURE — 73110 X-RAY EXAM OF WRIST: CPT | Mod: RT,,, | Performed by: ORTHOPAEDIC SURGERY

## 2024-05-16 PROCEDURE — 1159F MED LIST DOCD IN RCRD: CPT | Mod: CPTII,,, | Performed by: ORTHOPAEDIC SURGERY

## 2024-05-16 PROCEDURE — 3078F DIAST BP <80 MM HG: CPT | Mod: CPTII,,, | Performed by: ORTHOPAEDIC SURGERY

## 2024-05-16 PROCEDURE — 1101F PT FALLS ASSESS-DOCD LE1/YR: CPT | Mod: CPTII,,, | Performed by: ORTHOPAEDIC SURGERY

## 2024-05-16 PROCEDURE — 3288F FALL RISK ASSESSMENT DOCD: CPT | Mod: CPTII,,, | Performed by: ORTHOPAEDIC SURGERY

## 2024-05-16 PROCEDURE — 3008F BODY MASS INDEX DOCD: CPT | Mod: CPTII,,, | Performed by: ORTHOPAEDIC SURGERY

## 2024-05-16 RX ORDER — METHYLPREDNISOLONE 4 MG/1
TABLET ORAL
Qty: 1 EACH | Refills: 0 | Status: SHIPPED | OUTPATIENT
Start: 2024-05-16

## 2024-05-16 NOTE — PROGRESS NOTES
"Subjective:    CC: Wrist Pain ([place] carpal tunnel syndrome- Reports pain mostly in lt hand. Stated feels numb and tingling  when it is cold. Stated hands feel weak. Was wearing a brace at night but would make pain worse. )       HPI:  Patient comes in today for her 1st visit.  Patient complains of bilateral hand numbness and tingling for the last 7-8 months.  She states it is getting worse.  She feels like her hands are getting weak.  She denies any specific trauma, she denies any specific treatment.  She states the left is worse than the right, she is right-hand dominant.    ROS: Refer to HPI for pertinent ROS. All other 12 point systems negative.    Objective:  Vitals:    05/16/24 0801   BP: 118/65   Pulse: 62   Weight: 79.5 kg (175 lb 3.2 oz)   Height: 5' 2" (1.575 m)        Physical Exam:  Patient is well-nourished developed female she is awake alert and oriented x3 she has in no apparent distress he is pleasant and cooperative.  Examination of the bilateral upper extremities compartment soft and warm.  Skin is intact.  There is no signs symptoms of DVT or infection.  She is some mild stiffness of the right thumb, CMC joint.  She has a questionable Tinel's, positive Phalen's.  There was no obvious thenar hypothenar wasting.  She does have some overall stiffness, difficulty making a full fist full extension, neurovascular intact distally.    Images:  X-rays three views of the left and right wrist demonstrate some arthritic changes otherwise no obvious fracture or dislocation. Images Reviewed and discussed with patient.    Assessment:  1. Bilateral wrist pain  - X-Ray Wrist Complete Left; Future  - X-Ray Wrist Complete Right; Future    2. Bilateral carpal tunnel syndrome  - Ambulatory referral/consult to Orthopedics        Plan:  At this time we discussed her physical exam and x-ray findings.  We have discussed a Medrol Dosepak with appropriate precautions.  We have discussed her suspected carpal tunnel, we have " discussed a night splint for the left wrist.  We will also proceed with a nerve conduction study of the bilateral upper extremity.  I would like see back in 3 weeks to see how she is progressing and review her results.    Follow UP: No follow-ups on file.

## 2024-06-06 ENCOUNTER — OFFICE VISIT (OUTPATIENT)
Dept: ORTHOPEDICS | Facility: CLINIC | Age: 74
End: 2024-06-06
Payer: MEDICARE

## 2024-06-06 VITALS — HEIGHT: 62 IN | WEIGHT: 175.25 LBS | BODY MASS INDEX: 32.25 KG/M2

## 2024-06-06 DIAGNOSIS — G56.03 BILATERAL CARPAL TUNNEL SYNDROME: Primary | ICD-10-CM

## 2024-06-06 PROCEDURE — 1101F PT FALLS ASSESS-DOCD LE1/YR: CPT | Mod: CPTII,,, | Performed by: ORTHOPAEDIC SURGERY

## 2024-06-06 PROCEDURE — 3044F HG A1C LEVEL LT 7.0%: CPT | Mod: CPTII,,, | Performed by: ORTHOPAEDIC SURGERY

## 2024-06-06 PROCEDURE — 3008F BODY MASS INDEX DOCD: CPT | Mod: CPTII,,, | Performed by: ORTHOPAEDIC SURGERY

## 2024-06-06 PROCEDURE — 1160F RVW MEDS BY RX/DR IN RCRD: CPT | Mod: CPTII,,, | Performed by: ORTHOPAEDIC SURGERY

## 2024-06-06 PROCEDURE — 1159F MED LIST DOCD IN RCRD: CPT | Mod: CPTII,,, | Performed by: ORTHOPAEDIC SURGERY

## 2024-06-06 PROCEDURE — 99213 OFFICE O/P EST LOW 20 MIN: CPT | Mod: ,,, | Performed by: ORTHOPAEDIC SURGERY

## 2024-06-06 PROCEDURE — 3288F FALL RISK ASSESSMENT DOCD: CPT | Mod: CPTII,,, | Performed by: ORTHOPAEDIC SURGERY

## 2024-06-06 NOTE — PROGRESS NOTES
"Subjective:    CC: Results of the Left Hand (BUE NCS - pt states that the left is worse than the right side. She states that she has tingling around the base of her thumb. ) and Results of the Right Hand       HPI:  Patient returns today for repeat exam.  Patient continues to have numbness and tingling in both hands.  Patient states the left is worse than the right.  She did have a nerve conduction study of the bilateral upper extremities.  We have discussed her results.  She states the medicine and bracing have been helping.    ROS: Refer to [facility] for pertinent ROS. All other 12 point systems negative.    Objective:  Vitals:    06/06/24 0819   Weight: 79.5 kg (175 lb 4.3 oz)   Height: 5' 2" (1.575 m)        Physical Exam:  Bilateral upper extremities compartment soft and warm.  Skin is intact.  There is no signs symptoms of DVT or infection.  Questionable Tinel and Phalen's.  She is now able make a full fist has full extension, she has some mild atrophy of the thenar muscles both on the right and left.  She is neurovascular intact distally.    Images: . Images Reviewed and discussed with patient.    Assessment:  1. Bilateral carpal tunnel syndrome        Plan:  At this time we discussed her physical exam and nerve conduction findings.  We have discussed at length the pros and cons to additional conservative treatment surgical intervention.  Right now she would like to hold off on surgery, she will continue with her carpal tunnel bracing, hand exercises.  I would like see back in 2 months to see how she is progressing.  We have discussed surgical intervention if needed.    Follow UP: No follow-ups on file.              "

## 2024-06-13 DIAGNOSIS — M51.36 LUMBAR DEGENERATIVE DISC DISEASE: ICD-10-CM

## 2024-06-13 DIAGNOSIS — G89.29 CHRONIC BACK PAIN GREATER THAN 3 MONTHS DURATION: ICD-10-CM

## 2024-06-13 DIAGNOSIS — M54.16 LUMBAR RADICULITIS: ICD-10-CM

## 2024-06-13 DIAGNOSIS — M54.9 CHRONIC BACK PAIN GREATER THAN 3 MONTHS DURATION: ICD-10-CM

## 2024-06-13 RX ORDER — GABAPENTIN 300 MG/1
300 CAPSULE ORAL NIGHTLY
Qty: 30 CAPSULE | Refills: 2 | Status: SHIPPED | OUTPATIENT
Start: 2024-06-13

## 2024-07-15 ENCOUNTER — OFFICE VISIT (OUTPATIENT)
Dept: ORTHOPEDICS | Facility: CLINIC | Age: 74
End: 2024-07-15
Payer: MEDICARE

## 2024-07-15 DIAGNOSIS — G56.03 BILATERAL CARPAL TUNNEL SYNDROME: Primary | ICD-10-CM

## 2024-07-15 PROCEDURE — 1159F MED LIST DOCD IN RCRD: CPT | Mod: CPTII,,, | Performed by: ORTHOPAEDIC SURGERY

## 2024-07-15 PROCEDURE — 99213 OFFICE O/P EST LOW 20 MIN: CPT | Mod: ,,, | Performed by: ORTHOPAEDIC SURGERY

## 2024-07-15 PROCEDURE — 1160F RVW MEDS BY RX/DR IN RCRD: CPT | Mod: CPTII,,, | Performed by: ORTHOPAEDIC SURGERY

## 2024-07-15 PROCEDURE — 3044F HG A1C LEVEL LT 7.0%: CPT | Mod: CPTII,,, | Performed by: ORTHOPAEDIC SURGERY

## 2024-07-16 NOTE — PROGRESS NOTES
Subjective:    CC: Follow-up and Pain of the Left Wrist and Follow-up and Pain of the Right Wrist (F/U for [place] wrist/hand pain. Pt states pain is off and on and hurts more when they get cold. Has been doing home exercises. No new concerns with them.)       HPI:  Patient returns today for repeat exam.  Patient has a history of carpal tunnel.  She states she is doing better with the bracing.  She states it is worse with the cold weather, AC.  ROS: Refer to HPI for pertinent ROS. All other 12 point systems negative.    Objective:  There were no vitals filed for this visit.     Physical Exam:  Compartment soft and warm.  Skin is intact.  There is no signs or symptoms of DVT or infection.  She does have numbness and tingling throughout the median nerve distribution positive Tinel and Phalen's.  There was no obvious thenar hypothenar wasting.  She has able make a full fist full extension, neurovascular intact distally.    Images: . Images Reviewed and discussed with patient.    Assessment:  1. Bilateral carpal tunnel syndrome        Plan:  At this time we discussed her physical exam and previous nerve conduction findings.  We have discussed at length the pros and cons to additional conservative treatment surgical intervention.  We will continue conservative treatment, she is doing well.  She will call or return sooner if there is any new problems or difficulty or we would like to proceed with surgical intervention.    Follow UP: No follow-ups on file.

## 2024-07-29 ENCOUNTER — OFFICE VISIT (OUTPATIENT)
Dept: PAIN MEDICINE | Facility: CLINIC | Age: 74
End: 2024-07-29
Payer: MEDICARE

## 2024-07-29 VITALS
WEIGHT: 175 LBS | BODY MASS INDEX: 32.2 KG/M2 | HEIGHT: 62 IN | SYSTOLIC BLOOD PRESSURE: 136 MMHG | HEART RATE: 83 BPM | DIASTOLIC BLOOD PRESSURE: 72 MMHG

## 2024-07-29 DIAGNOSIS — G89.29 CHRONIC BACK PAIN GREATER THAN 3 MONTHS DURATION: Primary | ICD-10-CM

## 2024-07-29 DIAGNOSIS — M54.16 LUMBAR RADICULITIS: ICD-10-CM

## 2024-07-29 DIAGNOSIS — M51.36 LUMBAR DEGENERATIVE DISC DISEASE: ICD-10-CM

## 2024-07-29 DIAGNOSIS — M54.9 CHRONIC BACK PAIN GREATER THAN 3 MONTHS DURATION: Primary | ICD-10-CM

## 2024-07-29 PROBLEM — M51.369 LUMBAR DEGENERATIVE DISC DISEASE: Status: ACTIVE | Noted: 2024-07-29

## 2024-07-29 PROCEDURE — 3078F DIAST BP <80 MM HG: CPT | Mod: CPTII,,, | Performed by: ANESTHESIOLOGY

## 2024-07-29 PROCEDURE — 1125F AMNT PAIN NOTED PAIN PRSNT: CPT | Mod: CPTII,,, | Performed by: ANESTHESIOLOGY

## 2024-07-29 PROCEDURE — 3044F HG A1C LEVEL LT 7.0%: CPT | Mod: CPTII,,, | Performed by: ANESTHESIOLOGY

## 2024-07-29 PROCEDURE — 3008F BODY MASS INDEX DOCD: CPT | Mod: CPTII,,, | Performed by: ANESTHESIOLOGY

## 2024-07-29 PROCEDURE — 3075F SYST BP GE 130 - 139MM HG: CPT | Mod: CPTII,,, | Performed by: ANESTHESIOLOGY

## 2024-07-29 PROCEDURE — 1101F PT FALLS ASSESS-DOCD LE1/YR: CPT | Mod: CPTII,,, | Performed by: ANESTHESIOLOGY

## 2024-07-29 PROCEDURE — 3288F FALL RISK ASSESSMENT DOCD: CPT | Mod: CPTII,,, | Performed by: ANESTHESIOLOGY

## 2024-07-29 PROCEDURE — 96372 THER/PROPH/DIAG INJ SC/IM: CPT | Mod: ,,, | Performed by: ANESTHESIOLOGY

## 2024-07-29 PROCEDURE — 1160F RVW MEDS BY RX/DR IN RCRD: CPT | Mod: CPTII,,, | Performed by: ANESTHESIOLOGY

## 2024-07-29 PROCEDURE — 99213 OFFICE O/P EST LOW 20 MIN: CPT | Mod: 25,,, | Performed by: ANESTHESIOLOGY

## 2024-07-29 PROCEDURE — 1159F MED LIST DOCD IN RCRD: CPT | Mod: CPTII,,, | Performed by: ANESTHESIOLOGY

## 2024-07-29 RX ORDER — METHYLPREDNISOLONE ACETATE 80 MG/ML
80 INJECTION, SUSPENSION INTRA-ARTICULAR; INTRALESIONAL; INTRAMUSCULAR; SOFT TISSUE
Status: COMPLETED | OUTPATIENT
Start: 2024-07-29 | End: 2024-07-29

## 2024-07-29 RX ORDER — GABAPENTIN 300 MG/1
300 CAPSULE ORAL NIGHTLY
Qty: 30 CAPSULE | Refills: 2 | Status: SHIPPED | OUTPATIENT
Start: 2024-07-29

## 2024-07-29 RX ADMIN — METHYLPREDNISOLONE ACETATE 80 MG: 80 INJECTION, SUSPENSION INTRA-ARTICULAR; INTRALESIONAL; INTRAMUSCULAR; SOFT TISSUE at 09:07

## 2024-07-29 NOTE — PROGRESS NOTES
Elizabeth Conteh MD        PATIENT NAME: Meseret Mathis  : 1950  DATE: 24  MRN: [id number]      Billing Provider: Elizabeth Conteh MD  Level of Service:   Patient PCP Information       Provider PCP Type    Chepe Anaya Sr, MD General            Reason for Visit / Chief Complaint: Follow-up (6 month f/u chronic back pain C/O pain level 7, Taking Gabapentin for pain, feels pain in Rt hip more when sitting or standing for long periods of time. States pain same.)       Update PCP  Update Chief Complaint         History of Present Illness / Problem Focused Workflow     Meseret Mathis presents to the clinic with Follow-up (6 month f/u chronic back pain C/O pain level 7, Taking Gabapentin for pain, feels pain in Rt hip more when sitting or standing for long periods of time. States pain same.)       This is a 74-year-old female who returns to clinic today for follow up of low back pain with radiation down the right lower extremity to her ankle.  She denies any radiation down the left lower extremity.  She also does not have any numbness or tingling in the lower extremities.  At her last appointment here, I continued her on gabapentin as previously prescribed.  She only takes it at bedtime because it makes her a little drowsy during the day.  However, she does get good relief from the medication.  She has some pain she 1st wakes up in the morning, but it goes away once she starts moving around.  She is still doing exercises and stretches at home that she learned from physical therapy.  Her pain level stays around 6 or 7/10 on the NRS.      Pain    Follow-up    Low-back Pain  Associated symptoms include leg pain.   Back Pain  Associated symptoms include leg pain.     Review of Systems     Review of Systems   Musculoskeletal:  Positive for back pain, gait problem and leg pain.   All other systems reviewed and are negative.       Medical / Social / Family History     Past Medical History:   Diagnosis Date     Hyperlipidemia     Hypertension     Thyroid disease        History reviewed. No pertinent surgical history.    Social History  Ms. Mathis  reports that she has never smoked. She has never used smokeless tobacco. She reports that she does not currently use alcohol. She reports that she does not use drugs.    Family History  Ms.'s Mathis family history is not on file.    Medications and Allergies     Medications  Outpatient Medications Marked as Taking for the 7/29/24 encounter (Office Visit) with Elizabeth Conteh MD   Medication Sig Dispense Refill    allopurinoL (ZYLOPRIM) 100 MG tablet Take 100 mg by mouth once daily.      amLODIPine (NORVASC) 5 MG tablet Take 5 mg by mouth once daily.      aspirin (ECOTRIN) 81 MG EC tablet Take 81 mg by mouth once daily.      cetirizine (ZYRTEC) 10 MG tablet Take 10 mg by mouth.      dorzolamide-timolol 2-0.5% (COSOPT) 22.3-6.8 mg/mL ophthalmic solution Place into both eyes.      EDARBYCLOR 40-12.5 mg Tab Take 1 tablet by mouth once daily.      EFFER-K disintegrating tablet Take 10 mEq by mouth.      latanoprost 0.005 % ophthalmic solution Place into both eyes.      methIMAzole (TAPAZOLE) 5 MG Tab Take 1 tablet (5 mg total) by mouth once daily. 90 tablet 1    omeprazole (PRILOSEC) 40 MG capsule Take 40 mg by mouth 2 (two) times daily.      OZEMPIC 0.25 mg or 0.5 mg(2 mg/1.5 mL) pen injector Inject into the skin.      pravastatin (PRAVACHOL) 40 MG tablet Take 40 mg by mouth once daily.      [DISCONTINUED] gabapentin (NEURONTIN) 300 MG capsule TAKE 1 CAPSULE(300 MG) BY MOUTH EVERY EVENING 30 capsule 2    [DISCONTINUED] methylPREDNISolone (MEDROL DOSEPACK) 4 mg tablet use as directed 1 each 0    [DISCONTINUED] pregabalin (LYRICA) 100 MG capsule Take 100 mg by mouth 2 (two) times daily.       Current Facility-Administered Medications for the 7/29/24 encounter (Office Visit) with Elizabeth Conteh MD   Medication Dose Route Frequency Provider Last Rate Last Admin     methylPREDNISolone acetate injection 80 mg  80 mg Intramuscular 1 time in Clinic/HOD            Allergies  Review of patient's allergies indicates:  No Known Allergies    Physical Examination     Vitals:    07/29/24 0910   BP: 136/72   Pulse: 83       Spine Musculoskeletal Exam    Gait    Gait is normal.    Inspection    Thoracolumbar    Thoracolumbar inspection is normal.    Palpation    Thoracolumbar    Tenderness: present      Paraspinous: right and left    Right      Masses: none      Spasms: none      Crepitus: none      Muscle tone: normal    Left      Masses: none      Spasms: none      Crepitus: none      Muscle tone: normal    Range of Motion    Thoracolumbar        Thoracolumbar range of motion additional comments: Restricted range of motion in the lower back due to pain    Strength    Thoracolumbar    Thoracolumbar motor exam is normal.       Sensory    Thoracolumbar    Thoracolumbar sensation is normal.    Special Tests    Thoracolumbar      Right      SLR: no back or leg pain      Left      SLR: no back or leg pain    General      Constitutional: appears stated age, well-developed and well-nourished    Scleral icterus: no    Labored breathing: no    Psychiatric: normal mood and affect and no acute distress    Neurological: alert and oriented x3    Skin: intact    Lymphadenopathy: none     CV: extremities warm, well-perfused  Resp: nonlabored breathing    Assessment and Plan (including Health Maintenance)      Problem List  Smart Sets  Document Outside HM   :    Plan:   Chronic back pain greater than 3 months duration  -     gabapentin (NEURONTIN) 300 MG capsule; Take 1 capsule (300 mg total) by mouth every evening.  Dispense: 30 capsule; Refill: 2  -     methylPREDNISolone acetate injection 80 mg    Lumbar radiculitis  -     gabapentin (NEURONTIN) 300 MG capsule; Take 1 capsule (300 mg total) by mouth every evening.  Dispense: 30 capsule; Refill: 2  -     methylPREDNISolone acetate injection 80  mg    Lumbar degenerative disc disease  -     gabapentin (NEURONTIN) 300 MG capsule; Take 1 capsule (300 mg total) by mouth every evening.  Dispense: 30 capsule; Refill: 2  -     methylPREDNISolone acetate injection 80 mg      She will continue taking gabapentin as previously prescribed.  She is also receiving a Depo-Medrol intramuscular injection in the office today.  I will follow up with her again in 6 months.    Problem List Items Addressed This Visit          Neuro    Lumbar radiculitis    Relevant Medications    gabapentin (NEURONTIN) 300 MG capsule    methylPREDNISolone acetate injection 80 mg (Start on 7/29/2024  9:45 AM)    Lumbar degenerative disc disease    Relevant Medications    gabapentin (NEURONTIN) 300 MG capsule    methylPREDNISolone acetate injection 80 mg (Start on 7/29/2024  9:45 AM)       Orthopedic    Chronic back pain greater than 3 months duration - Primary    Relevant Medications    gabapentin (NEURONTIN) 300 MG capsule    methylPREDNISolone acetate injection 80 mg (Start on 7/29/2024  9:45 AM)           No future appointments.       There are no Patient Instructions on file for this visit.  No follow-ups on file.     Signature:  Elizabeth Conteh MD      Date of encounter: 7/29/24

## 2024-09-20 ENCOUNTER — TELEPHONE (OUTPATIENT)
Dept: ENDOCRINOLOGY | Facility: CLINIC | Age: 74
End: 2024-09-20
Payer: MEDICARE

## 2024-09-20 NOTE — TELEPHONE ENCOUNTER
----- Message from Pamela Roman sent at 9/20/2024 11:48 AM CDT -----  Pt of [place]      Pt called stating she needs a refill on medication methIMAzole (TAPAZOLE) 5 MG Tab.    Pt call back number  [phone]        Please advise

## 2024-09-20 NOTE — TELEPHONE ENCOUNTER
Attempted to contact pt as per last progress note with Dr Krishna pt requested that she continue f/u with her pcp for condition, no answer, left message for return call     Graves Disease s/p right butch thyroidectomy  Thyroid nodules  -continue 5 mg of methimazole  -Discussed complete thyroidectomy/radiation therapy in the past, patient would like to continue current medical therapy at this time.    -thyroid ultrasound performed 12/1/2023 with some nodularities although none requiring further surveillance or biopsy.   -TSH stable at 3.332 as of 3/6/2024  -patient would like to continue follow-up with PCP, recommend repeat TSH every 6-12 months and a repeat thyroid US in 2-3 years.  Would also recommend continued monitoring of CBC and CMP for monitoring of medication side effects including bone marrow insufficiency and elevated LFT's

## 2024-09-20 NOTE — TELEPHONE ENCOUNTER
Return call received from pt and explained that refills need to be done by pcp. Pt verbalizes understanding and will contact pcp

## 2024-10-01 ENCOUNTER — LAB VISIT (OUTPATIENT)
Dept: LAB | Facility: HOSPITAL | Age: 74
End: 2024-10-01
Attending: INTERNAL MEDICINE
Payer: MEDICARE

## 2024-10-01 DIAGNOSIS — I10 HYPERTENSION, UNSPECIFIED TYPE: ICD-10-CM

## 2024-10-01 DIAGNOSIS — E11.42 DIABETIC POLYNEUROPATHY: Primary | ICD-10-CM

## 2024-10-01 DIAGNOSIS — E55.9 AVITAMINOSIS D: ICD-10-CM

## 2024-10-01 DIAGNOSIS — E07.9 DISEASE OF THYROID GLAND: ICD-10-CM

## 2024-10-01 DIAGNOSIS — E78.2 MIXED HYPERLIPIDEMIA: ICD-10-CM

## 2024-10-01 LAB
25(OH)D3+25(OH)D2 SERPL-MCNC: 25 NG/ML (ref 30–80)
ALBUMIN SERPL-MCNC: 3.9 G/DL (ref 3.4–4.8)
ALBUMIN/GLOB SERPL: 1.1 RATIO (ref 1.1–2)
ALP SERPL-CCNC: 94 UNIT/L (ref 40–150)
ALT SERPL-CCNC: 13 UNIT/L (ref 0–55)
ANION GAP SERPL CALC-SCNC: 6 MEQ/L
AST SERPL-CCNC: 21 UNIT/L (ref 5–34)
BASOPHILS # BLD AUTO: 0.07 X10(3)/MCL
BASOPHILS NFR BLD AUTO: 0.9 %
BILIRUB SERPL-MCNC: 0.3 MG/DL
BUN SERPL-MCNC: 16.7 MG/DL (ref 9.8–20.1)
CALCIUM SERPL-MCNC: 10.3 MG/DL (ref 8.4–10.2)
CHLORIDE SERPL-SCNC: 106 MMOL/L (ref 98–107)
CHOLEST SERPL-MCNC: 179 MG/DL
CHOLEST/HDLC SERPL: 3 {RATIO} (ref 0–5)
CO2 SERPL-SCNC: 30 MMOL/L (ref 23–31)
CREAT SERPL-MCNC: 1.09 MG/DL (ref 0.55–1.02)
CREAT/UREA NIT SERPL: 15
EOSINOPHIL # BLD AUTO: 0.23 X10(3)/MCL (ref 0–0.9)
EOSINOPHIL NFR BLD AUTO: 3 %
ERYTHROCYTE [DISTWIDTH] IN BLOOD BY AUTOMATED COUNT: 14.5 % (ref 11.5–17)
EST. AVERAGE GLUCOSE BLD GHB EST-MCNC: 114 MG/DL
GFR SERPLBLD CREATININE-BSD FMLA CKD-EPI: 54 ML/MIN/1.73/M2
GLOBULIN SER-MCNC: 3.7 GM/DL (ref 2.4–3.5)
GLUCOSE SERPL-MCNC: 90 MG/DL (ref 82–115)
HBA1C MFR BLD: 5.6 %
HCT VFR BLD AUTO: 40.4 % (ref 37–47)
HDLC SERPL-MCNC: 55 MG/DL (ref 35–60)
HGB BLD-MCNC: 12.9 G/DL (ref 12–16)
IMM GRANULOCYTES # BLD AUTO: 0.03 X10(3)/MCL (ref 0–0.04)
IMM GRANULOCYTES NFR BLD AUTO: 0.4 %
LDLC SERPL CALC-MCNC: 110 MG/DL (ref 50–140)
LYMPHOCYTES # BLD AUTO: 2.62 X10(3)/MCL (ref 0.6–4.6)
LYMPHOCYTES NFR BLD AUTO: 33.7 %
MCH RBC QN AUTO: 27.8 PG (ref 27–31)
MCHC RBC AUTO-ENTMCNC: 31.9 G/DL (ref 33–36)
MCV RBC AUTO: 87.1 FL (ref 80–94)
MICROALBUMIN UR-MCNC: 8.4 UG/ML
MONOCYTES # BLD AUTO: 0.54 X10(3)/MCL (ref 0.1–1.3)
MONOCYTES NFR BLD AUTO: 6.9 %
NEUTROPHILS # BLD AUTO: 4.28 X10(3)/MCL (ref 2.1–9.2)
NEUTROPHILS NFR BLD AUTO: 55.1 %
NRBC BLD AUTO-RTO: 0 %
PLATELET # BLD AUTO: 255 X10(3)/MCL (ref 130–400)
PMV BLD AUTO: 10.5 FL (ref 7.4–10.4)
POTASSIUM SERPL-SCNC: 3.6 MMOL/L (ref 3.5–5.1)
PROT SERPL-MCNC: 7.6 GM/DL (ref 5.8–7.6)
RBC # BLD AUTO: 4.64 X10(6)/MCL (ref 4.2–5.4)
SODIUM SERPL-SCNC: 142 MMOL/L (ref 136–145)
T4 FREE SERPL-MCNC: 0.88 NG/DL (ref 0.7–1.48)
TRIGL SERPL-MCNC: 69 MG/DL (ref 37–140)
TSH SERPL-ACNC: 5.02 UIU/ML (ref 0.35–4.94)
URATE SERPL-MCNC: 4.9 MG/DL (ref 2.6–6)
VLDLC SERPL CALC-MCNC: 14 MG/DL
WBC # BLD AUTO: 7.77 X10(3)/MCL (ref 4.5–11.5)

## 2024-10-01 PROCEDURE — 82043 UR ALBUMIN QUANTITATIVE: CPT

## 2024-10-01 PROCEDURE — 84443 ASSAY THYROID STIM HORMONE: CPT

## 2024-10-01 PROCEDURE — 36415 COLL VENOUS BLD VENIPUNCTURE: CPT

## 2024-10-01 PROCEDURE — 84550 ASSAY OF BLOOD/URIC ACID: CPT

## 2024-10-01 PROCEDURE — 83036 HEMOGLOBIN GLYCOSYLATED A1C: CPT

## 2024-10-01 PROCEDURE — 85025 COMPLETE CBC W/AUTO DIFF WBC: CPT

## 2024-10-01 PROCEDURE — 84439 ASSAY OF FREE THYROXINE: CPT

## 2024-10-01 PROCEDURE — 80061 LIPID PANEL: CPT

## 2024-10-01 PROCEDURE — 82306 VITAMIN D 25 HYDROXY: CPT

## 2024-10-01 PROCEDURE — 80053 COMPREHEN METABOLIC PANEL: CPT

## 2024-12-18 ENCOUNTER — HOSPITAL ENCOUNTER (OUTPATIENT)
Dept: RADIOLOGY | Facility: HOSPITAL | Age: 74
Discharge: HOME | End: 2024-12-18
Attending: INTERNAL MEDICINE
Payer: MEDICARE

## 2024-12-18 DIAGNOSIS — Z12.31 OTHER SCREENING MAMMOGRAM: ICD-10-CM

## 2024-12-18 PROCEDURE — 77067 SCR MAMMO BI INCL CAD: CPT | Mod: 26,,, | Performed by: RADIOLOGY

## 2024-12-18 PROCEDURE — 77067 SCR MAMMO BI INCL CAD: CPT | Mod: TC

## 2024-12-18 PROCEDURE — 77063 BREAST TOMOSYNTHESIS BI: CPT | Mod: 26,,, | Performed by: RADIOLOGY

## 2025-01-18 DIAGNOSIS — M51.369 LUMBAR DEGENERATIVE DISC DISEASE: ICD-10-CM

## 2025-01-18 DIAGNOSIS — M54.9 CHRONIC BACK PAIN GREATER THAN 3 MONTHS DURATION: ICD-10-CM

## 2025-01-18 DIAGNOSIS — G89.29 CHRONIC BACK PAIN GREATER THAN 3 MONTHS DURATION: ICD-10-CM

## 2025-01-18 DIAGNOSIS — M54.16 LUMBAR RADICULITIS: ICD-10-CM

## 2025-01-24 RX ORDER — GABAPENTIN 300 MG/1
300 CAPSULE ORAL NIGHTLY
Qty: 30 CAPSULE | Refills: 2 | Status: SHIPPED | OUTPATIENT
Start: 2025-01-24 | End: 2025-01-29 | Stop reason: SDUPTHER

## 2025-01-29 ENCOUNTER — OFFICE VISIT (OUTPATIENT)
Facility: CLINIC | Age: 75
End: 2025-01-29
Payer: MEDICARE

## 2025-01-29 VITALS
BODY MASS INDEX: 32.39 KG/M2 | WEIGHT: 176 LBS | HEART RATE: 65 BPM | SYSTOLIC BLOOD PRESSURE: 99 MMHG | DIASTOLIC BLOOD PRESSURE: 73 MMHG | HEIGHT: 62 IN

## 2025-01-29 DIAGNOSIS — G89.29 CHRONIC BACK PAIN GREATER THAN 3 MONTHS DURATION: ICD-10-CM

## 2025-01-29 DIAGNOSIS — M54.16 LUMBAR RADICULITIS: ICD-10-CM

## 2025-01-29 DIAGNOSIS — M54.9 CHRONIC BACK PAIN GREATER THAN 3 MONTHS DURATION: ICD-10-CM

## 2025-01-29 DIAGNOSIS — M51.362 DEGENERATION OF INTERVERTEBRAL DISC OF LUMBAR REGION WITH DISCOGENIC BACK PAIN AND LOWER EXTREMITY PAIN: Primary | ICD-10-CM

## 2025-01-29 DIAGNOSIS — M51.369 LUMBAR DEGENERATIVE DISC DISEASE: ICD-10-CM

## 2025-01-29 PROCEDURE — 3074F SYST BP LT 130 MM HG: CPT | Mod: CPTII,,, | Performed by: ANESTHESIOLOGY

## 2025-01-29 PROCEDURE — 1125F AMNT PAIN NOTED PAIN PRSNT: CPT | Mod: CPTII,,, | Performed by: ANESTHESIOLOGY

## 2025-01-29 PROCEDURE — 3288F FALL RISK ASSESSMENT DOCD: CPT | Mod: CPTII,,, | Performed by: ANESTHESIOLOGY

## 2025-01-29 PROCEDURE — 1159F MED LIST DOCD IN RCRD: CPT | Mod: CPTII,,, | Performed by: ANESTHESIOLOGY

## 2025-01-29 PROCEDURE — 1160F RVW MEDS BY RX/DR IN RCRD: CPT | Mod: CPTII,,, | Performed by: ANESTHESIOLOGY

## 2025-01-29 PROCEDURE — 99213 OFFICE O/P EST LOW 20 MIN: CPT | Mod: 25,,, | Performed by: ANESTHESIOLOGY

## 2025-01-29 PROCEDURE — 3008F BODY MASS INDEX DOCD: CPT | Mod: CPTII,,, | Performed by: ANESTHESIOLOGY

## 2025-01-29 PROCEDURE — 3078F DIAST BP <80 MM HG: CPT | Mod: CPTII,,, | Performed by: ANESTHESIOLOGY

## 2025-01-29 PROCEDURE — 1101F PT FALLS ASSESS-DOCD LE1/YR: CPT | Mod: CPTII,,, | Performed by: ANESTHESIOLOGY

## 2025-01-29 PROCEDURE — 96372 THER/PROPH/DIAG INJ SC/IM: CPT | Mod: ,,, | Performed by: ANESTHESIOLOGY

## 2025-01-29 RX ORDER — METHYLPREDNISOLONE ACETATE 80 MG/ML
80 INJECTION, SUSPENSION INTRA-ARTICULAR; INTRALESIONAL; INTRAMUSCULAR; SOFT TISSUE
Status: COMPLETED | OUTPATIENT
Start: 2025-01-29 | End: 2025-01-29

## 2025-01-29 RX ORDER — POTASSIUM CHLORIDE 750 MG/1
10 CAPSULE, EXTENDED RELEASE ORAL
COMMUNITY
Start: 2025-01-18

## 2025-01-29 RX ORDER — GABAPENTIN 300 MG/1
300 CAPSULE ORAL NIGHTLY
Qty: 30 CAPSULE | Refills: 5 | Status: SHIPPED | OUTPATIENT
Start: 2025-01-29

## 2025-01-29 RX ORDER — ACETAMINOPHEN AND CODEINE PHOSPHATE 300; 30 MG/1; MG/1
1 TABLET ORAL
COMMUNITY
Start: 2024-11-25

## 2025-01-29 RX ADMIN — METHYLPREDNISOLONE ACETATE 80 MG: 80 INJECTION, SUSPENSION INTRA-ARTICULAR; INTRALESIONAL; INTRAMUSCULAR; SOFT TISSUE at 09:01

## 2025-01-29 NOTE — PROGRESS NOTES
Elizabeth Conteh MD        PATIENT NAME: Meseret Mathis  : 1950  DATE: 25  MRN: [id number]      Billing Provider: Elizabeth Conteh MD  Level of Service:   Patient PCP Information       Provider PCP Type    Chepe Anaya Sr, MD General            Reason for Visit / Chief Complaint: Follow-up (6 month f/u depo medrol injection 25 states injection helped requesting IM today C/O pain level 2, Taking Gabapentin for pain states needs refill.)       Update PCP  Update Chief Complaint         History of Present Illness / Problem Focused Workflow     Meseret Mathis presents to the clinic with Follow-up (6 month f/u depo medrol injection 25 states injection helped requesting IM today C/O pain level 2, Taking Gabapentin for pain states needs refill.)       This is a 74-year-old female who returns to clinic today for follow up of low back pain with radiation down the right lower extremity to her ankle.  She denies any radiation down the left lower extremity.  She also does not have any numbness or tingling in the lower extremities.  She only takes gabapentin at bedtime because it makes her a little drowsy during the day.  However, she does get good relief from the medication.  She has some pain she first wakes up in the morning, but it goes away once she starts moving around.  She is still doing exercises and stretches at home that she learned from physical therapy.  Her pain level is currently rates 2/10 on the NRS. It gets up to 5/10 at worst when she gets up in the morning.        Pain    Follow-up    Low-back Pain  Associated symptoms include leg pain.   Back Pain  Associated symptoms include leg pain.     Review of Systems     Review of Systems   Musculoskeletal:  Positive for back pain, gait problem and leg pain.   All other systems reviewed and are negative.       Medical / Social / Family History     Past Medical History:   Diagnosis Date    Hyperlipidemia     Hypertension     Thyroid disease         History reviewed. No pertinent surgical history.    Social History  Ms. Mathis  reports that she has never smoked. She has never used smokeless tobacco. She reports that she does not currently use alcohol. She reports that she does not use drugs.    Family History  Ms.'s Mathis family history is not on file.    Medications and Allergies     Medications  Outpatient Medications Marked as Taking for the 1/29/25 encounter (Office Visit) with Elizabeth Conteh MD   Medication Sig Dispense Refill    acetaminophen-codeine 300-30mg (TYLENOL #3) 300-30 mg Tab Take 1 tablet by mouth.      allopurinoL (ZYLOPRIM) 100 MG tablet Take 100 mg by mouth once daily.      amLODIPine (NORVASC) 5 MG tablet Take 5 mg by mouth once daily.      aspirin (ECOTRIN) 81 MG EC tablet Take 81 mg by mouth once daily.      cetirizine (ZYRTEC) 10 MG tablet Take 10 mg by mouth.      dorzolamide-timolol 2-0.5% (COSOPT) 22.3-6.8 mg/mL ophthalmic solution Place into both eyes.      EDARBYCLOR 40-12.5 mg Tab Take 1 tablet by mouth once daily.      EFFER-K disintegrating tablet Take 10 mEq by mouth.      latanoprost 0.005 % ophthalmic solution Place into both eyes.      omeprazole (PRILOSEC) 40 MG capsule Take 40 mg by mouth 2 (two) times daily.      potassium chloride (MICRO-K) 10 MEQ CpSR Take 10 mEq by mouth.      pravastatin (PRAVACHOL) 40 MG tablet Take 40 mg by mouth once daily.      SITagliptin phosphate (JANUVIA) 100 MG Tab Take 100 mg by mouth once daily.      [DISCONTINUED] gabapentin (NEURONTIN) 300 MG capsule TAKE 1 CAPSULE(300 MG) BY MOUTH EVERY EVENING 30 capsule 2    [DISCONTINUED] OZEMPIC 0.25 mg or 0.5 mg(2 mg/1.5 mL) pen injector Inject into the skin.       Current Facility-Administered Medications for the 1/29/25 encounter (Office Visit) with Elizabeth Conteh MD   Medication Dose Route Frequency Provider Last Rate Last Admin    methylPREDNISolone acetate injection 80 mg  80 mg Intramuscular 1 time in Clinic/HOD             Allergies  Review of patient's allergies indicates:  No Known Allergies    Physical Examination     Vitals:    01/29/25 0844   BP: 99/73   Pulse: 65       Spine Musculoskeletal Exam    Gait    Gait is normal.    Inspection    Thoracolumbar    Thoracolumbar inspection is normal.    Palpation    Thoracolumbar    Tenderness: present      Paraspinous: right and left    Right      Masses: none      Spasms: none      Crepitus: none      Muscle tone: normal    Left      Masses: none      Spasms: none      Crepitus: none      Muscle tone: normal    Range of Motion    Thoracolumbar        Thoracolumbar range of motion additional comments: Restricted range of motion in the lower back due to pain    Strength    Thoracolumbar    Thoracolumbar motor exam is normal.       Sensory    Thoracolumbar    Thoracolumbar sensation is normal.    Special Tests    Thoracolumbar      Right      SLR: no back or leg pain      Left      SLR: no back or leg pain    General      Constitutional: appears stated age, well-developed and well-nourished    Scleral icterus: no    Labored breathing: no    Psychiatric: normal mood and affect and no acute distress    Neurological: alert and oriented x3    Skin: intact    Lymphadenopathy: none     CV: extremities warm, well-perfused  Resp: nonlabored breathing    Assessment and Plan (including Health Maintenance)      Problem List  Smart Sets  Document Outside HM   :    Plan:   Degeneration of intervertebral disc of lumbar region with discogenic back pain and lower extremity pain  -     methylPREDNISolone acetate injection 80 mg    Lumbar radiculitis  -     methylPREDNISolone acetate injection 80 mg  -     gabapentin (NEURONTIN) 300 MG capsule; Take 1 capsule (300 mg total) by mouth every evening.  Dispense: 30 capsule; Refill: 5    Chronic back pain greater than 3 months duration  -     methylPREDNISolone acetate injection 80 mg  -     gabapentin (NEURONTIN) 300 MG capsule; Take 1 capsule (300 mg  total) by mouth every evening.  Dispense: 30 capsule; Refill: 5    Lumbar degenerative disc disease  -     methylPREDNISolone acetate injection 80 mg  -     gabapentin (NEURONTIN) 300 MG capsule; Take 1 capsule (300 mg total) by mouth every evening.  Dispense: 30 capsule; Refill: 5      She will continue taking gabapentin as previously prescribed.  She is also receiving a Depo-Medrol intramuscular injection in the office today.  I will follow up with her again in 6 months.    Problem List Items Addressed This Visit          Neuro    Lumbar radiculitis    Relevant Medications    methylPREDNISolone acetate injection 80 mg (Start on 1/29/2025  9:45 AM)    gabapentin (NEURONTIN) 300 MG capsule    Lumbar degenerative disc disease - Primary    Relevant Medications    methylPREDNISolone acetate injection 80 mg (Start on 1/29/2025  9:45 AM)    gabapentin (NEURONTIN) 300 MG capsule       Orthopedic    Chronic back pain greater than 3 months duration    Relevant Medications    methylPREDNISolone acetate injection 80 mg (Start on 1/29/2025  9:45 AM)    gabapentin (NEURONTIN) 300 MG capsule           Future Appointments   Date Time Provider Department Center   7/29/2025  9:00 AM Elizabeth Conteh MD [facility]          There are no Patient Instructions on file for this visit.  Follow up in about 6 months (around 7/29/2025).     Signature:  Elizabeth Conteh MD      Date of encounter: 1/29/25

## 2025-03-04 ENCOUNTER — HOSPITAL ENCOUNTER (EMERGENCY)
Facility: HOSPITAL | Age: 75
Discharge: HOME | End: 2025-03-04
Attending: STUDENT IN AN ORGANIZED HEALTH CARE EDUCATION/TRAINING PROGRAM
Payer: MEDICARE

## 2025-03-04 VITALS
HEIGHT: 65 IN | HEART RATE: 80 BPM | TEMPERATURE: 98 F | SYSTOLIC BLOOD PRESSURE: 128 MMHG | WEIGHT: 176 LBS | RESPIRATION RATE: 14 BRPM | BODY MASS INDEX: 29.32 KG/M2 | OXYGEN SATURATION: 98 % | DIASTOLIC BLOOD PRESSURE: 72 MMHG

## 2025-03-04 DIAGNOSIS — E86.0 DEHYDRATION: ICD-10-CM

## 2025-03-04 DIAGNOSIS — A08.4 VIRAL GASTROENTERITIS: Primary | ICD-10-CM

## 2025-03-04 DIAGNOSIS — R10.13 EPIGASTRIC ABDOMINAL PAIN: ICD-10-CM

## 2025-03-04 LAB
ALBUMIN SERPL-MCNC: 4.2 G/DL (ref 3.4–4.8)
ALBUMIN/GLOB SERPL: 1.1 RATIO (ref 1.1–2)
ALP SERPL-CCNC: 102 UNIT/L (ref 40–150)
ALT SERPL-CCNC: 12 UNIT/L (ref 0–55)
ANION GAP SERPL CALC-SCNC: 8 MEQ/L
AST SERPL-CCNC: 19 UNIT/L (ref 5–34)
BASOPHILS # BLD AUTO: 0.04 X10(3)/MCL
BASOPHILS NFR BLD AUTO: 0.3 %
BILIRUB SERPL-MCNC: 0.5 MG/DL
BUN SERPL-MCNC: 18.7 MG/DL (ref 9.8–20.1)
CALCIUM SERPL-MCNC: 9.6 MG/DL (ref 8.4–10.2)
CHLORIDE SERPL-SCNC: 108 MMOL/L (ref 98–107)
CO2 SERPL-SCNC: 24 MMOL/L (ref 23–31)
CREAT SERPL-MCNC: 1.15 MG/DL (ref 0.55–1.02)
CREAT/UREA NIT SERPL: 16
EOSINOPHIL # BLD AUTO: 0.07 X10(3)/MCL (ref 0–0.9)
EOSINOPHIL NFR BLD AUTO: 0.5 %
ERYTHROCYTE [DISTWIDTH] IN BLOOD BY AUTOMATED COUNT: 14.5 % (ref 11.5–17)
GFR SERPLBLD CREATININE-BSD FMLA CKD-EPI: 51 ML/MIN/1.73/M2
GLOBULIN SER-MCNC: 3.9 GM/DL (ref 2.4–3.5)
GLUCOSE SERPL-MCNC: 96 MG/DL (ref 82–115)
HCT VFR BLD AUTO: 41.4 % (ref 37–47)
HGB BLD-MCNC: 13.5 G/DL (ref 12–16)
IMM GRANULOCYTES # BLD AUTO: 0.06 X10(3)/MCL (ref 0–0.04)
IMM GRANULOCYTES NFR BLD AUTO: 0.4 %
LIPASE SERPL-CCNC: 28 U/L
LYMPHOCYTES # BLD AUTO: 0.76 X10(3)/MCL (ref 0.6–4.6)
LYMPHOCYTES NFR BLD AUTO: 5.7 %
MCH RBC QN AUTO: 28.1 PG (ref 27–31)
MCHC RBC AUTO-ENTMCNC: 32.6 G/DL (ref 33–36)
MCV RBC AUTO: 86.1 FL (ref 80–94)
MONOCYTES # BLD AUTO: 0.46 X10(3)/MCL (ref 0.1–1.3)
MONOCYTES NFR BLD AUTO: 3.4 %
NEUTROPHILS # BLD AUTO: 12.01 X10(3)/MCL (ref 2.1–9.2)
NEUTROPHILS NFR BLD AUTO: 89.7 %
NRBC BLD AUTO-RTO: 0 %
PLATELET # BLD AUTO: 242 X10(3)/MCL (ref 130–400)
PMV BLD AUTO: 10.6 FL (ref 7.4–10.4)
POTASSIUM SERPL-SCNC: 3.6 MMOL/L (ref 3.5–5.1)
PROT SERPL-MCNC: 8.1 GM/DL (ref 5.8–7.6)
RBC # BLD AUTO: 4.81 X10(6)/MCL (ref 4.2–5.4)
SODIUM SERPL-SCNC: 140 MMOL/L (ref 136–145)
TROPONIN I SERPL-MCNC: <0.01 NG/ML (ref 0–0.04)
WBC # BLD AUTO: 13.4 X10(3)/MCL (ref 4.5–11.5)

## 2025-03-04 PROCEDURE — 80053 COMPREHEN METABOLIC PANEL: CPT | Performed by: PHYSICIAN ASSISTANT

## 2025-03-04 PROCEDURE — 63600175 PHARM REV CODE 636 W HCPCS: Performed by: PHYSICIAN ASSISTANT

## 2025-03-04 PROCEDURE — 84484 ASSAY OF TROPONIN QUANT: CPT | Performed by: PHYSICIAN ASSISTANT

## 2025-03-04 PROCEDURE — 99284 EMERGENCY DEPT VISIT MOD MDM: CPT | Mod: 25

## 2025-03-04 PROCEDURE — 93005 ELECTROCARDIOGRAM TRACING: CPT

## 2025-03-04 PROCEDURE — 63600175 PHARM REV CODE 636 W HCPCS: Mod: JZ,TB | Performed by: PHYSICIAN ASSISTANT

## 2025-03-04 PROCEDURE — 85025 COMPLETE CBC W/AUTO DIFF WBC: CPT | Performed by: PHYSICIAN ASSISTANT

## 2025-03-04 PROCEDURE — 96375 TX/PRO/DX INJ NEW DRUG ADDON: CPT

## 2025-03-04 PROCEDURE — 96374 THER/PROPH/DIAG INJ IV PUSH: CPT

## 2025-03-04 PROCEDURE — 93010 ELECTROCARDIOGRAM REPORT: CPT | Mod: ,,, | Performed by: STUDENT IN AN ORGANIZED HEALTH CARE EDUCATION/TRAINING PROGRAM

## 2025-03-04 PROCEDURE — 25000003 PHARM REV CODE 250: Performed by: PHYSICIAN ASSISTANT

## 2025-03-04 PROCEDURE — 83690 ASSAY OF LIPASE: CPT | Performed by: PHYSICIAN ASSISTANT

## 2025-03-04 RX ORDER — ONDANSETRON 4 MG/1
4 TABLET, FILM COATED ORAL EVERY 6 HOURS
Qty: 12 TABLET | Refills: 0 | Status: SHIPPED | OUTPATIENT
Start: 2025-03-04

## 2025-03-04 RX ORDER — ONDANSETRON HYDROCHLORIDE 2 MG/ML
4 INJECTION, SOLUTION INTRAVENOUS
Status: COMPLETED | OUTPATIENT
Start: 2025-03-04 | End: 2025-03-04

## 2025-03-04 RX ORDER — SODIUM CHLORIDE 9 MG/ML
1000 INJECTION, SOLUTION INTRAVENOUS
Status: COMPLETED | OUTPATIENT
Start: 2025-03-04 | End: 2025-03-04

## 2025-03-04 RX ORDER — KETOROLAC TROMETHAMINE 30 MG/ML
15 INJECTION, SOLUTION INTRAMUSCULAR; INTRAVENOUS
Status: COMPLETED | OUTPATIENT
Start: 2025-03-04 | End: 2025-03-04

## 2025-03-04 RX ADMIN — ONDANSETRON 4 MG: 2 INJECTION INTRAMUSCULAR; INTRAVENOUS at 01:03

## 2025-03-04 RX ADMIN — KETOROLAC TROMETHAMINE 15 MG: 30 INJECTION, SOLUTION INTRAMUSCULAR; INTRAVENOUS at 02:03

## 2025-03-04 RX ADMIN — SODIUM CHLORIDE 1000 ML: 9 INJECTION, SOLUTION INTRAVENOUS at 02:03

## 2025-03-04 NOTE — FIRST PROVIDER EVALUATION
"Medical screening examination initiated.  I have conducted a focused provider triage encounter, findings are as follows:  Chief Complaint   Patient presents with    Vomiting     C/o vomiting and epigastric pain onset this morning. Denies diarrhea/constipation. Hx HTN.        Brief history of present illness:  74-year-old female presents to ED for evaluation of epigastric pain with nausea and vomiting onset this morning.  Denies any diarrhea.    Vitals:    03/04/25 1235   BP: (!) 155/74   Pulse: 109   Resp: 16   Temp: 98.1 °F (36.7 °C)   TempSrc: Oral   SpO2: 98%   Weight: 79.8 kg (176 lb)   Height: 5' 5" (1.651 m)       Pertinent physical exam:  Patient awake and alert    Brief workup plan:  Labs, UA, EKG, meds    Preliminary workup initiated; this workup will be continued and followed by the physician or advanced practice provider that is assigned to the patient when roomed.  "

## 2025-03-04 NOTE — ED PROVIDER NOTES
Encounter Date: 3/4/2025       History     Chief Complaint   Patient presents with    Vomiting     C/o vomiting and epigastric pain onset this morning. Denies diarrhea/constipation. Hx HTN.      See MDM for details.      The history is provided by the patient. No  was used.     Review of patient's allergies indicates:  No Known Allergies  Past Medical History:   Diagnosis Date    Hyperlipidemia     Hypertension     Thyroid disease      No past surgical history on file.  No family history on file.  Social History[1]  Review of Systems   Constitutional: Negative.    HENT: Negative.     Eyes: Negative.    Respiratory: Negative.     Cardiovascular: Negative.    Gastrointestinal:  Positive for diarrhea and nausea.   Genitourinary: Negative.    Musculoskeletal: Negative.    Neurological: Negative.        Physical Exam     Initial Vitals [03/04/25 1235]   BP Pulse Resp Temp SpO2   (!) 155/74 109 16 98.1 °F (36.7 °C) 98 %      MAP       --         Physical Exam    Nursing note and vitals reviewed.  Constitutional: She appears well-developed and well-nourished. No distress.   HENT:   Head: Normocephalic and atraumatic.   Eyes: Conjunctivae, EOM and lids are normal. Pupils are equal, round, and reactive to light.   Neck: Neck supple.   Normal range of motion.  Cardiovascular:  Normal rate and regular rhythm.           Pulmonary/Chest: Effort normal and breath sounds normal.   Abdominal: Abdomen is soft and flat. Bowel sounds are normal. She exhibits no distension and no mass. There is no abdominal tenderness.   No tendedness on exam. There is no rebound and no guarding.   Musculoskeletal:      Cervical back: Normal range of motion and neck supple.     Neurological: She is alert and oriented to person, place, and time. She has normal strength. She is not disoriented. No cranial nerve deficit or sensory deficit. GCS eye subscore is 4. GCS verbal subscore is 5. GCS motor subscore is 6.   Skin: Skin is warm  and intact.   Psychiatric: She has a normal mood and affect. Her speech is normal and behavior is normal. Judgment and thought content normal. Cognition and memory are normal.         ED Course   Procedures  Labs Reviewed   CBC WITH DIFFERENTIAL - Abnormal       Result Value    WBC 13.40 (*)     RBC 4.81      Hgb 13.5      Hct 41.4      MCV 86.1      MCH 28.1      MCHC 32.6 (*)     RDW 14.5      Platelet 242      MPV 10.6 (*)     Neut % 89.7      Lymph % 5.7      Mono % 3.4      Eos % 0.5      Basophil % 0.3      Imm Grans % 0.4      Neut # 12.01 (*)     Lymph # 0.76      Mono # 0.46      Eos # 0.07      Baso # 0.04      Imm Gran # 0.06 (*)     NRBC% 0.0     COMPREHENSIVE METABOLIC PANEL - Abnormal    Sodium 140      Potassium 3.6      Chloride 108 (*)     CO2 24      Glucose 96      Blood Urea Nitrogen 18.7      Creatinine 1.15 (*)     Calcium 9.6      Protein Total 8.1 (*)     Albumin 4.2      Globulin 3.9 (*)     Albumin/Globulin Ratio 1.1      Bilirubin Total 0.5            ALT 12      AST 19      eGFR 51      Anion Gap 8.0      BUN/Creatinine Ratio 16     TROPONIN I - Normal    Troponin-I <0.010     LIPASE - Normal    Lipase Level 28     CBC W/ AUTO DIFFERENTIAL    Narrative:     The following orders were created for panel order CBC auto differential.  Procedure                               Abnormality         Status                     ---------                               -----------         ------                     CBC with Differential[[id number]]       Abnormal            Final result                 Please view results for these tests on the individual orders.     EKG Readings: (Independently Interpreted)   Initial Reading: No STEMI. Rhythm: Normal Sinus Rhythm. Heart Rate: 93. Ectopy: No Ectopy. Conduction: Normal. ST Segments: Normal ST Segments.       Imaging Results    None          Medications   ondansetron injection 4 mg (4 mg Intravenous Given 3/4/25 1311)   ketorolac injection 15 mg  (15 mg Intravenous Given 3/4/25 1429)   0.9% NaCl infusion (1,000 mLs Intravenous New Bag 3/4/25 1429)     Medical Decision Making  74yoAAF w/hx of HTN, and HLD presents to the ER with nausea, vomiting, and decreased appetite since this am. Denies abdominal pain, fever, chest pain, chills, or shortness of breath. No medicine taken for this today. No known sick contacts.       Problems Addressed:  Dehydration: acute illness or injury  Viral gastroenteritis: acute illness or injury     Details: Differential diagnosis included but not limited to:  Viral infection, gastritis, gastroenteritis, dehydration    Likely gastroenteritis and dehydration.  Patient improving medicine given in the emergency department.  No acute found.  No acute abdominal pain or tenderness on exam.  Do not recommend any additional imaging.  Patient will follow up with PCP. All questions asked and answered at the time of the visit. Strict ER precautions given. Patient and family members agreeable to plan.       Amount and/or Complexity of Data Reviewed  Labs: ordered. Decision-making details documented in ED Course.  ECG/medicine tests: ordered. Decision-making details documented in ED Course.    Risk  Prescription drug management.                                      Clinical Impression:  Final diagnoses:  [R10.13] Epigastric abdominal pain  [A08.4] Viral gastroenteritis (Primary)  [E86.0] Dehydration          ED Disposition Condition    Discharge Stable          ED Prescriptions       Medication Sig Dispense Start Date End Date Auth. Provider    ondansetron (ZOFRAN) 4 MG tablet Take 1 tablet (4 mg total) by mouth every 6 (six) hours. 12 tablet 3/4/2025 -- Antonella Giles PA          Follow-up Information       Follow up With Specialties Details Why Contact Info    Chepe Anaya Sr., MD Internal Medicine Schedule an appointment as soon as possible for a visit today  [address] [facility] [address] [id number] [phone]      [facility] - Emergency Dept Emergency Medicine  As needed, If symptoms worsen [address] [facility] [id number] [phone]          This note was typed partially using voice recognition software.  Please be reminded that not all corrections/addendums to grammar may have been made prior to closing of this chart.         [1]   Social History  Tobacco Use    Smoking status: Never    Smokeless tobacco: Never   Substance Use Topics    Alcohol use: Not Currently    Drug use: Never        Antonella Giles PA  03/04/25 1538

## 2025-03-05 LAB
OHS QRS DURATION: 70 MS
OHS QTC CALCULATION: 445 MS

## 2025-03-10 ENCOUNTER — LAB VISIT (OUTPATIENT)
Dept: LAB | Facility: HOSPITAL | Age: 75
End: 2025-03-10
Attending: INTERNAL MEDICINE
Payer: MEDICARE

## 2025-03-10 DIAGNOSIS — E05.90 PRETIBIAL MYXEDEMA: ICD-10-CM

## 2025-03-10 DIAGNOSIS — R79.9 ABNORMAL BLOOD CHEMISTRY: ICD-10-CM

## 2025-03-10 DIAGNOSIS — E11.42 DIABETIC POLYNEUROPATHY: Primary | ICD-10-CM

## 2025-03-10 LAB
ANION GAP SERPL CALC-SCNC: 9 MEQ/L
BUN SERPL-MCNC: 19.2 MG/DL (ref 9.8–20.1)
CALCIUM SERPL-MCNC: 9.6 MG/DL (ref 8.4–10.2)
CHLORIDE SERPL-SCNC: 106 MMOL/L (ref 98–107)
CO2 SERPL-SCNC: 27 MMOL/L (ref 23–31)
CREAT SERPL-MCNC: 1.02 MG/DL (ref 0.55–1.02)
CREAT/UREA NIT SERPL: 19
EST. AVERAGE GLUCOSE BLD GHB EST-MCNC: 116.9 MG/DL
GFR SERPLBLD CREATININE-BSD FMLA CKD-EPI: 58 ML/MIN/1.73/M2
GLUCOSE SERPL-MCNC: 95 MG/DL (ref 82–115)
HBA1C MFR BLD: 5.7 %
POTASSIUM SERPL-SCNC: 4.2 MMOL/L (ref 3.5–5.1)
SODIUM SERPL-SCNC: 142 MMOL/L (ref 136–145)
T4 FREE SERPL-MCNC: 1.03 NG/DL (ref 0.7–1.48)
TSH SERPL-ACNC: 5.32 UIU/ML (ref 0.35–4.94)

## 2025-03-10 PROCEDURE — 84439 ASSAY OF FREE THYROXINE: CPT

## 2025-03-10 PROCEDURE — 84443 ASSAY THYROID STIM HORMONE: CPT

## 2025-03-10 PROCEDURE — 80048 BASIC METABOLIC PNL TOTAL CA: CPT

## 2025-03-10 PROCEDURE — 83036 HEMOGLOBIN GLYCOSYLATED A1C: CPT

## 2025-03-10 PROCEDURE — 36415 COLL VENOUS BLD VENIPUNCTURE: CPT

## 2025-05-27 ENCOUNTER — TELEPHONE (OUTPATIENT)
Facility: CLINIC | Age: 75
End: 2025-05-27
Payer: MEDICARE

## 2025-05-27 NOTE — PROGRESS NOTES
Pain Management Clinic    Subjective:     Chief Complaint: Hip Pain (Left sided hip and leg pain /Pain level today is 9/10)    Referred by: No ref. provider found     History of Present Illness: Meseret Mathis is a 75 y.o. female presents today f for pain to left-sided leg and hip pain.  She reports this is new pain and denies any injuries.  Her discomfort has always been on her right lower extremity to her ankle not her left.  She was last seen in clinic by my supervising physician in July of 2024.  This is my 1st time evaluating the patient in clinic..  Patient reports she has completed physical therapy for this pain as well for about a month with minimal to no pain relief.  She describes this pain as sharp and will elevate to a 9/10 on the NRS when she sits too long, walks too long and when she egress is into and out of bed.  Her pain will reduce slightly to a 6/10 on the NRS if she changes positions and she takes gabapentin 300 mg at bedtime; however, she is not receiving pain relief for this new left-sided hip and lateral leg discomfort.  She does not have an MRI of her lumbar spine in his interested in doing so at [facility].  In the past she has tried Tylenol No. 3 as well as Robaxin as needed that helped somewhat but she is not taking this anymore.  Her pain score today as 9/10 on the NRS.  She is an independent ambulator.  She is agreeable to follow back up in 3 weeks after her MRI lumbar spine as complete to go over the results and plan of care for her new left hip and left lateral leg pain.  She has no history of spinal surgeries        HPI During last office visit with my supervising physician, Dr. Conteh on 07/29/2024 included the following:    This is a 74-year-old female who returns to clinic today for follow up of low back pain with radiation down the right lower extremity to her ankle.  She denies any radiation down the left lower extremity.  She also does not have any numbness or tingling in the lower  "extremities.  At her last appointment here, I continued her on gabapentin as previously prescribed.  She only takes it at bedtime because it makes her a little drowsy during the day.  However, she does get good relief from the medication.  She has some pain she 1st wakes up in the morning, but it goes away once she starts moving around.  She is still doing exercises and stretches at home that she learned from physical therapy.  Her pain level stays around 6 or 7/10 on the NRS.    Pertinent PMH:  Hyperlipidemia, hypertension, thyroid disease  Pertinent PSH:  No surgical history is on file, no pacemaker or defibrillator or metal in her body.       Vital signs:   Visit Vitals  /75 (BP Location: Left arm, Patient Position: Sitting)   Pulse 79   Ht 5' 5" (1.651 m)   Wt 79.8 kg (175 lb 14.8 oz)   LMP  (LMP Unknown)   SpO2 98%   BMI 29.28 kg/m²      Vitals:    05/28/25 0948   PainSc:   9     Pain Disability Index (PDI): 56       Interventional Pain History  None    ROS:  Left hip and left lateral leg pain    MRI lumbar spine  (none obtained in EMR or )        Objective:        Physical Exam  General: Well developed; overweight; A&O x 3; No anxiety/depression; NAD  Mental Status: Oriented to person, palce and time. Displays appropriate mood & affect.  Head: Norm cephalic and atraumatic  Neck:  No cervical paraspinal banding.  Full range of motion with lateral turning and cervical flexion +extension.  Eyes: normal conjunctiva, normal lids, normal pupils  ENT and mouth: normal external ear, nose, and no lesions noted on the lips.  Respiratory: Symmetrical, Unlabored. No dyspnea  CV: normal rhythm and rate. No peripheral edema.   Abdomen: Non-distended    Extremities:  Gen: No cyanosis or tenderness to palpation bilateral upper and lower extremities  Skin: Warm, pink, dry, no rashes, no lesions on the lumbar spine  Strength: 5/5 motor strength bilateral upper and lower extremities  ROM: Full ROM in bilateral " knees and ankles without pain or instability.    Neuro:  Gait: no altalgic lean, normal toe and heel raise. Independent ambulator.  DTR's: 2+ in bilateral patellar, and ankle  Sensory: Intact to light touch bilateral  upper and lower extremities    Spine: Normal lordosis. No scoliosis  L-spine ROM:  Limited and painful on left ROM to flexion, extension, bilateral rotation,   Straight Leg Raise:  , positive left  SI Joint: No tenderness to palpation bilaterally.      Assessment:       History of Present Illness: Meseret Mathis is a 75 y.o. female presents today f for pain to left-sided leg and hip pain.  She reports this is new pain and denies any injuries.  Her discomfort has always been on her right lower extremity to her ankle not her left.  She was last seen in clinic by my supervising physician in July of 2024.  This is my 1st time evaluating the patient in clinic..  Patient reports she has completed physical therapy for this pain as well for about a month with minimal to no pain relief.  She describes this pain as sharp and will elevate to a 9/10 on the NRS when she sits too long, walks too long and when she egress is into and out of bed.  Her pain will reduce slightly to a 6/10 on the NRS if she changes positions and she takes gabapentin 300 mg at bedtime; however, she is not receiving pain relief for this new left-sided hip and lateral leg discomfort.  She does not have an MRI of her lumbar spine in his interested in doing so at [facility].  In the past she has tried Tylenol No. 3 as well as Robaxin as needed that helped somewhat but she is not taking this anymore.  Her pain score today as 9/10 on the NRS.  She is an independent ambulator.  She is agreeable to follow back up in 3 weeks after her MRI lumbar spine as complete to go over the results and plan of care for her new left hip and left lateral leg pain.  She has no history of spinal surgeries       Plan of care:   Order placed for MRI lumbar spine.    Patient  to follow-up with me in clinic in 3 weeks  Edit additional gabapentin 100 mg by mouth twice daily.  She will continue to take gabapentin 300 mg at bedtime for sleep and pain relief    Encounter Diagnoses   Name Primary?    Chronic back pain greater than 3 months duration Yes    Lumbar radiculitis          Plan:       Meseret was seen today for hip pain.    Diagnoses and all orders for this visit:    Chronic back pain greater than 3 months duration    Lumbar radiculitis           Past Medical History:   Diagnosis Date    Hyperlipidemia     Hypertension     Thyroid disease        History reviewed. No pertinent surgical history.    No family history on file.    Social History     Socioeconomic History    Marital status:    Tobacco Use    Smoking status: Never    Smokeless tobacco: Never   Substance and Sexual Activity    Alcohol use: Not Currently    Drug use: Never    Sexual activity: Not Currently     Partners: Female     Social Drivers of Health     Financial Resource Strain: High Risk (10/16/2024)    Received from [facility] SDOH Screening     In the past year, have you been unable to get any of the following when you really needed them? choose all that apply.: Medicine or health care   Housing Stability: High Risk (10/24/2024)    Received from [facility] SDOH Screening     In the past year, have you been unable to get any of the following when you really needed them? choose all that apply.: Utilities (electric, gas, and water)       Current Medications[1]    Review of patient's allergies indicates:  No Known Allergies               [1]   Current Outpatient Medications   Medication Sig Dispense Refill    allopurinoL (ZYLOPRIM) 100 MG tablet Take 100 mg by mouth once daily.      amLODIPine (NORVASC) 5 MG tablet Take 5 mg by mouth once daily.      aspirin (ECOTRIN) 81 MG EC tablet Take 81 mg by mouth once daily.      dorzolamide-timolol 2-0.5% (COSOPT) 22.3-6.8 mg/mL ophthalmic solution  Place into both eyes.      EDARBYCLOR 40-12.5 mg Tab Take 1 tablet by mouth once daily.      EFFER-K disintegrating tablet Take 10 mEq by mouth.      gabapentin (NEURONTIN) 300 MG capsule Take 1 capsule (300 mg total) by mouth every evening. 30 capsule 5    latanoprost 0.005 % ophthalmic solution Place into both eyes.      methIMAzole (TAPAZOLE) 5 MG Tab Take 1 tablet (5 mg total) by mouth once daily. 90 tablet 1    methocarbamoL (ROBAXIN) 500 MG Tab Take 500 mg by mouth 2 (two) times daily.      omeprazole (PRILOSEC) 40 MG capsule Take 40 mg by mouth 2 (two) times daily.      ondansetron (ZOFRAN) 4 MG tablet Take 1 tablet (4 mg total) by mouth every 6 (six) hours. 12 tablet 0    potassium chloride (MICRO-K) 10 MEQ CpSR Take 10 mEq by mouth.      pravastatin (PRAVACHOL) 40 MG tablet Take 40 mg by mouth once daily.      predniSONE (DELTASONE) 20 MG tablet Take 40 mg by mouth.      SITagliptin phosphate (JANUVIA) 100 MG Tab Take 100 mg by mouth once daily.      acetaminophen-codeine 300-30mg (TYLENOL #3) 300-30 mg Tab Take 1 tablet by mouth. (Patient not taking: Reported on 5/28/2025)      acyclovir (ZOVIRAX) 800 MG Tab Take 1 tablet (800 mg total) by mouth 5 (five) times daily. for 10 days 50 tablet 0     No current facility-administered medications for this visit.

## 2025-05-28 ENCOUNTER — OFFICE VISIT (OUTPATIENT)
Facility: CLINIC | Age: 75
End: 2025-05-28
Payer: MEDICARE

## 2025-05-28 VITALS
DIASTOLIC BLOOD PRESSURE: 75 MMHG | HEART RATE: 79 BPM | BODY MASS INDEX: 29.31 KG/M2 | WEIGHT: 175.94 LBS | OXYGEN SATURATION: 98 % | SYSTOLIC BLOOD PRESSURE: 135 MMHG | HEIGHT: 65 IN

## 2025-05-28 DIAGNOSIS — M54.9 DORSALGIA, UNSPECIFIED: ICD-10-CM

## 2025-05-28 DIAGNOSIS — G89.29 CHRONIC BACK PAIN GREATER THAN 3 MONTHS DURATION: Primary | ICD-10-CM

## 2025-05-28 DIAGNOSIS — M54.16 LUMBAR RADICULITIS: ICD-10-CM

## 2025-05-28 DIAGNOSIS — M54.9 CHRONIC BACK PAIN GREATER THAN 3 MONTHS DURATION: Primary | ICD-10-CM

## 2025-05-28 PROCEDURE — 99214 OFFICE O/P EST MOD 30 MIN: CPT | Mod: ,,, | Performed by: NURSE PRACTITIONER

## 2025-05-28 PROCEDURE — 3044F HG A1C LEVEL LT 7.0%: CPT | Mod: CPTII,,, | Performed by: NURSE PRACTITIONER

## 2025-05-28 PROCEDURE — 3078F DIAST BP <80 MM HG: CPT | Mod: CPTII,,, | Performed by: NURSE PRACTITIONER

## 2025-05-28 PROCEDURE — 3008F BODY MASS INDEX DOCD: CPT | Mod: CPTII,,, | Performed by: NURSE PRACTITIONER

## 2025-05-28 PROCEDURE — 3075F SYST BP GE 130 - 139MM HG: CPT | Mod: CPTII,,, | Performed by: NURSE PRACTITIONER

## 2025-05-28 PROCEDURE — 3288F FALL RISK ASSESSMENT DOCD: CPT | Mod: CPTII,,, | Performed by: NURSE PRACTITIONER

## 2025-05-28 PROCEDURE — 1125F AMNT PAIN NOTED PAIN PRSNT: CPT | Mod: CPTII,,, | Performed by: NURSE PRACTITIONER

## 2025-05-28 PROCEDURE — 1160F RVW MEDS BY RX/DR IN RCRD: CPT | Mod: CPTII,,, | Performed by: NURSE PRACTITIONER

## 2025-05-28 PROCEDURE — 1159F MED LIST DOCD IN RCRD: CPT | Mod: CPTII,,, | Performed by: NURSE PRACTITIONER

## 2025-05-28 PROCEDURE — 1101F PT FALLS ASSESS-DOCD LE1/YR: CPT | Mod: CPTII,,, | Performed by: NURSE PRACTITIONER

## 2025-05-28 RX ORDER — GABAPENTIN 100 MG/1
100 CAPSULE ORAL 2 TIMES DAILY
Qty: 60 CAPSULE | Refills: 11 | Status: SHIPPED | OUTPATIENT
Start: 2025-05-28 | End: 2026-05-28

## 2025-05-28 RX ORDER — METHOCARBAMOL 500 MG/1
500 TABLET, FILM COATED ORAL 2 TIMES DAILY
COMMUNITY
Start: 2025-04-21

## 2025-05-28 RX ORDER — PREDNISONE 20 MG/1
40 TABLET ORAL
COMMUNITY
Start: 2025-05-12 | End: 2025-05-28 | Stop reason: ALTCHOICE

## 2025-05-30 ENCOUNTER — TELEPHONE (OUTPATIENT)
Facility: CLINIC | Age: 75
End: 2025-05-30
Payer: MEDICARE

## 2025-05-30 NOTE — TELEPHONE ENCOUNTER
Obtained on Prolia.  Follows with Dr. Jyoti Abbasi, DEXA due next year   Sending message to Dr Rajput     Pt contacted office requesting something for relief states gabapentin does not provide any relief, , since taking gabapentin pt states it seems like her pain gets worse states gabapentin does not provide any relief she has not been able to sleep, states if pain can not be resolved she may have to go to the ER.    pt states MRI is scheduled for 6/11/25 at [facility].   Pain is located left buttock radiates down leg to the foot, pain feels like a constant ache, pain rated this morning on a scale of 10/10, please advise thanks     Correct pharmacy [facility]

## 2025-05-31 ENCOUNTER — HOSPITAL ENCOUNTER (EMERGENCY)
Facility: HOSPITAL | Age: 75
Discharge: HOME | End: 2025-05-31
Attending: STUDENT IN AN ORGANIZED HEALTH CARE EDUCATION/TRAINING PROGRAM
Payer: MEDICARE

## 2025-05-31 VITALS
HEART RATE: 83 BPM | SYSTOLIC BLOOD PRESSURE: 125 MMHG | OXYGEN SATURATION: 99 % | TEMPERATURE: 98 F | WEIGHT: 175 LBS | RESPIRATION RATE: 18 BRPM | BODY MASS INDEX: 29.16 KG/M2 | HEIGHT: 65 IN | DIASTOLIC BLOOD PRESSURE: 74 MMHG

## 2025-05-31 DIAGNOSIS — M54.32 LEFT SIDED SCIATICA: Primary | ICD-10-CM

## 2025-05-31 PROCEDURE — 25000003 PHARM REV CODE 250: Performed by: PHYSICIAN ASSISTANT

## 2025-05-31 PROCEDURE — 99283 EMERGENCY DEPT VISIT LOW MDM: CPT

## 2025-05-31 RX ORDER — ACETAMINOPHEN AND CODEINE PHOSPHATE 300; 30 MG/1; MG/1
1 TABLET ORAL EVERY 8 HOURS PRN
Qty: 15 TABLET | Refills: 0 | Status: SHIPPED | OUTPATIENT
Start: 2025-05-31 | End: 2025-06-05

## 2025-05-31 RX ORDER — DICLOFENAC SODIUM 50 MG/1
50 TABLET, DELAYED RELEASE ORAL 3 TIMES DAILY
Qty: 21 TABLET | Refills: 0 | Status: SHIPPED | OUTPATIENT
Start: 2025-05-31 | End: 2025-06-07

## 2025-05-31 RX ORDER — HYDROCODONE BITARTRATE AND ACETAMINOPHEN 5; 325 MG/1; MG/1
1 TABLET ORAL
Refills: 0 | Status: COMPLETED | OUTPATIENT
Start: 2025-05-31 | End: 2025-05-31

## 2025-05-31 RX ADMIN — HYDROCODONE BITARTRATE AND ACETAMINOPHEN 1 TABLET: 5; 325 TABLET ORAL at 10:05

## 2025-06-09 NOTE — TELEPHONE ENCOUNTER
Patient has chronic kidney disease per review of her last office visit.  Since she is already taking gabapentin that does not help I can offer Lidoderm 5% patches 12 hours on 12 hours off to see if that can help her.

## 2025-06-11 ENCOUNTER — HOSPITAL ENCOUNTER (OUTPATIENT)
Dept: RADIOLOGY | Facility: HOSPITAL | Age: 75
Discharge: HOME | End: 2025-06-11
Attending: NURSE PRACTITIONER
Payer: MEDICARE

## 2025-06-11 DIAGNOSIS — M54.9 DORSALGIA, UNSPECIFIED: ICD-10-CM

## 2025-06-11 DIAGNOSIS — M54.16 LUMBAR RADICULITIS: ICD-10-CM

## 2025-06-11 PROCEDURE — 72148 MRI LUMBAR SPINE W/O DYE: CPT | Mod: TC

## 2025-06-18 ENCOUNTER — OFFICE VISIT (OUTPATIENT)
Facility: CLINIC | Age: 75
End: 2025-06-18
Payer: MEDICARE

## 2025-06-18 VITALS
SYSTOLIC BLOOD PRESSURE: 111 MMHG | DIASTOLIC BLOOD PRESSURE: 69 MMHG | HEART RATE: 78 BPM | BODY MASS INDEX: 29.32 KG/M2 | WEIGHT: 176 LBS | HEIGHT: 65 IN

## 2025-06-18 DIAGNOSIS — M54.16 LUMBAR RADICULOPATHY: Primary | ICD-10-CM

## 2025-06-18 DIAGNOSIS — M54.9 CHRONIC BACK PAIN GREATER THAN 3 MONTHS DURATION: ICD-10-CM

## 2025-06-18 DIAGNOSIS — G89.29 CHRONIC BACK PAIN GREATER THAN 3 MONTHS DURATION: ICD-10-CM

## 2025-06-18 PROCEDURE — 3074F SYST BP LT 130 MM HG: CPT | Mod: CPTII,,, | Performed by: NURSE PRACTITIONER

## 2025-06-18 PROCEDURE — 3044F HG A1C LEVEL LT 7.0%: CPT | Mod: CPTII,,, | Performed by: NURSE PRACTITIONER

## 2025-06-18 PROCEDURE — 1125F AMNT PAIN NOTED PAIN PRSNT: CPT | Mod: CPTII,,, | Performed by: NURSE PRACTITIONER

## 2025-06-18 PROCEDURE — 1101F PT FALLS ASSESS-DOCD LE1/YR: CPT | Mod: CPTII,,, | Performed by: NURSE PRACTITIONER

## 2025-06-18 PROCEDURE — 3288F FALL RISK ASSESSMENT DOCD: CPT | Mod: CPTII,,, | Performed by: NURSE PRACTITIONER

## 2025-06-18 PROCEDURE — 3078F DIAST BP <80 MM HG: CPT | Mod: CPTII,,, | Performed by: NURSE PRACTITIONER

## 2025-06-18 PROCEDURE — 3008F BODY MASS INDEX DOCD: CPT | Mod: CPTII,,, | Performed by: NURSE PRACTITIONER

## 2025-06-18 PROCEDURE — 1159F MED LIST DOCD IN RCRD: CPT | Mod: CPTII,,, | Performed by: NURSE PRACTITIONER

## 2025-06-18 PROCEDURE — 99214 OFFICE O/P EST MOD 30 MIN: CPT | Mod: ,,, | Performed by: NURSE PRACTITIONER

## 2025-06-18 PROCEDURE — 1160F RVW MEDS BY RX/DR IN RCRD: CPT | Mod: CPTII,,, | Performed by: NURSE PRACTITIONER

## 2025-06-18 NOTE — PROGRESS NOTES
[facility]    Subjective:     Chief Complaint: Results (3 week f/u MRI results C/O pain level 6, Taking pain meds states helping)    Referred by: No ref. provider found     History of Present Illness: Meseret Mathis is a 75 y.o. female presents today as a three-week follow-up to go over MRI lumbar spine results and plan of care additionally, during last office visit with me patient was given an additional gabapentin 100 mg by mouth to take twice a day and she would continue taking her gabapentin 300 mg at bedtime for sleep and pain relief.  Patient also states her pain medications are helping.  Her pain score is a 6/10 on the NRS.  Patient confirms her primary pain is now located to her left buttock and will radiate down her left lateral leg and left ankle.  During her exam she had a negative Oscar's and thigh thrust on the left and right.  Her pain is better when she is walking and sleeping on her right side.  Her pain elevates to a 10/10 if she lays down on her left side and she reports this feels like a sharp pain.  There pertinent findings in her MRI lumbar spine at L5-S1 that show endplate changes with broad-based disc osteophyte and moderate bilateral foraminal stenosis.  Her pain score today as a 6/10 on the NRS.  She her pain feels sharp in his worse with lying down on the left.  This will cause her pain to elevate to a 10/10 on the NRS.  Additionally she reports her pain improves with walking.  Additionally she has to sleep on her right side to offset her pain temporarily.  Patient also relayed that her pain moved from the right side now everything is on the left side.  She denies any new injuries.  She has no history of spinal surgery, pacemaker, defibrillator or spinal cord stimulator.  She is very interested in trying a left L5 +S1 transforaminal epidural steroid injection.  She will hold her 81 mg aspirin for 1 week and resume postop.        HPI during last office visit with me on  05/28/2025 included the following:    leg and hip pain.  She reports this is new pain and denies any injuries.  Her discomfort has always been on her right lower extremity to her ankle not her left.  She was last seen in clinic by my supervising physician in July of 2024.  This is my 1st time evaluating the patient in clinic..  Patient reports she has completed physical therapy for this pain as well for about a month with minimal to no pain relief.  She describes this pain as sharp and will elevate to a 9/10 on the NRS when she sits too long, walks too long and when she egress is into and out of bed.  Her pain will reduce slightly to a 6/10 on the NRS if she changes positions and she takes gabapentin 300 mg at bedtime; however, she is not receiving pain relief for this new left-sided hip and lateral leg discomfort.  She does not have an MRI of her lumbar spine in his interested in doing so at [facility].  In the past she has tried Tylenol No. 3 as well as Robaxin as needed that helped somewhat but she is not taking this anymore.  Her pain score today as 9/10 on the NRS.  She is an independent ambulator.  She is agreeable to follow back up in 3 weeks after her MRI lumbar spine as complete to go over the results and plan of care for her new left hip and left lateral leg pain.  She has no history of spinal surgeries         HPI During last office visit with my supervising physician, Dr. Coneth on 07/29/2024 included the following:    This is a 74-year-old female who returns to clinic today for follow up of low back pain with radiation down the right lower extremity to her ankle.  She denies any radiation down the left lower extremity.  She also does not have any numbness or tingling in the lower extremities.  At her last appointment here, I continued her on gabapentin as previously prescribed.  She only takes it at bedtime because it makes her a little drowsy during the day.  However, she does get good relief from the  "medication.  She has some pain she 1st wakes up in the morning, but it goes away once she starts moving around.  She is still doing exercises and stretches at home that she learned from physical therapy.  Her pain level stays around 6 or 7/10 on the NRS.     Pertinent PMH:  Hyperlipidemia, hypertension, thyroid disease  Pertinent PSH:  No surgical history is on file, no pacemaker or defibrillator or metal in her body.          Vital signs:   Visit Vitals  /69 (BP Location: Right arm, Patient Position: Sitting)   Pulse 78   Ht 5' 5" (1.651 m)   Wt 79.8 kg (176 lb)   LMP  (LMP Unknown)   BMI 29.29 kg/m²      Vitals:    06/18/25 1425   PainSc:   6     Pain Disability Index (PDI): 56       Interventional Pain History  None    ROS: Left buttock, left lateral leg discomfort.    MRI lumbar spine 2025:  FINDINGS:  Straightening of the normal lumbar lordosis.  Incidental small benign intraosseous hemangioma of L3 vertebral body.  No spondylolisthesis.  No aggressive marrow signal.  No acute fracture.  No intrinsic abnormality of the distal cord or cauda equina.  No acute or significant paraspinous soft tissue abnormality.  Small incidental left renal superior pole cortical cyst.     Congenitally short pedicles with superimposed multilevel lumbar degeneration as follows:     T12-L1: Mild disc degeneration with disc bulge causing mild central canal stenosis.  No significant foraminal stenosis.     L1-L2: Moderate disc degeneration, mild Modic type 1 endplate change, disc bulge, and mild bilateral facet joint degeneration causing mild central canal stenosis and mild bilateral foraminal stenosis.     L2-L3: Moderate disc degeneration with Modic type 1 endplate changes, disc bulge, and mild bilateral facet joint degeneration causing mild to moderate central canal stenosis and mild-to-moderate bilateral foraminal stenosis.     L3-L4: Moderate disc degeneration, Modic type 1 endplate changes, disc bulge, and marginal " osteophyte causing mild-to-moderate central canal stenosis and moderate bilateral foraminal stenosis.     L4-L5: Moderate disc degeneration, Modic type 2 endplate changes, broad-based disc osteophyte with superimposed superiorly migrating central disc extrusion, and mild right facet joint degeneration which causes mild central canal stenosis and moderate bilateral foraminal stenosis.     L5-S1: Moderate disc degeneration, Modic type 1 and type 2 endplate changes with broad-based disc osteophyte causing mild bilateral subarticular recess stenosis and moderate bilateral foraminal stenosis.  Central canal is patent.     Impression:     Straightening of the normal lumbar lordosis, congenitally short pedicles, and significant multilevel spondylosis as described on a level by level basis.        Electronically signed by:Abdiel Yanez  Date:                                            06/11/2025  Time:                                           10:23        Objective:        Physical Exam  Mental Status: Oriented to person, palce and time. Displays appropriate mood & affect.  Head: Norm cephalic and atraumatic  Neck:  No cervical paraspinal banding.  Full range of motion with lateral turning and cervical flexion +extension.  Eyes: normal conjunctiva, normal lids, normal pupils  ENT and mouth: normal external ear, nose, and no lesions noted on the lips.  Respiratory: Symmetrical, Unlabored. No dyspnea  CV: normal rhythm and rate. No peripheral edema.   Abdomen: Non-distended     Extremities:  Gen: No cyanosis or tenderness to palpation bilateral upper and lower extremities  Skin: Warm, pink, dry, no rashes, no lesions on the lumbar spine  Strength: 5/5 motor strength bilateral upper and lower extremities  ROM: Full ROM in bilateral knees and ankles without pain or instability.     Neuro:  Gait: no altalgic lean, normal toe and heel raise. Independent ambulator.  DTR's: 2+ in bilateral patellar,  Sensory: Intact to light touch  bilateral  upper and lower extremities     Spine: Normal lordosis. No scoliosis  L-spine ROM:  Limited and painful on left ROM to flexion, extension, bilateral rotation,   Straight Leg Raise:  , positive left, negative right.  SI Joint: No tenderness to palpation bilaterally.  Negative Oscar's and thigh thrust bilaterally.      Assessment:       Meseret Mathis is a 75 y.o. female presents today as a three-week follow-up to go over MRI lumbar spine results and plan of care additionally, during last office visit with me patient was given an additional gabapentin 100 mg by mouth to take twice a day and she would continue taking her gabapentin 300 mg at bedtime for sleep and pain relief.  Patient also states her pain medications are helping.  Her pain score is a 6/10 on the NRS.  Patient confirms her primary pain is now located to her left buttock and will radiate down her left lateral leg and left ankle.  During her exam she had a negative Oscar's and thigh thrust on the left and right.  Her pain is better when she is walking and sleeping on her right side.  Her pain elevates to a 10/10 if she lays down on her left side and she reports this feels like a sharp pain.  There pertinent findings in her MRI lumbar spine at L5-S1 that show endplate changes with broad-based disc osteophyte and moderate bilateral foraminal stenosis.  Her pain score today as a 6/10 on the NRS.  She her pain feels sharp in his worse with lying down on the left.  This will cause her pain to elevate to a 10/10 on the NRS.  Additionally she reports her pain improves with walking.  Additionally she has to sleep on her right side to offset her pain temporarily.  Patient also relayed that her pain moved from the right side now everything is on the left side.  She denies any new injuries.  She has no history of spinal surgery, pacemaker, defibrillator or spinal cord stimulator.  She is very interested in trying a left L5 +S1 transforaminal epidural  steroid injection.  She will hold her 81 mg aspirin for 1 week and resume postop.    Plan of care:   Request sent for left L5 +S1 transforaminal epidural steroid injection   Hold aspirin 81 mg 1 week prior to procedure and resume postop.    Encounter Diagnoses   Name Primary?    Lumbar radiculopathy Yes    Chronic back pain greater than 3 months duration          Plan:       Meseret was seen today for results.    Diagnoses and all orders for this visit:    Lumbar radiculopathy    Chronic back pain greater than 3 months duration           Past Medical History:   Diagnosis Date    Hyperlipidemia     Hypertension     Thyroid disease        History reviewed. No pertinent surgical history.    No family history on file.    Social History     Socioeconomic History    Marital status:    Tobacco Use    Smoking status: Never    Smokeless tobacco: Never   Substance and Sexual Activity    Alcohol use: Not Currently    Drug use: Never    Sexual activity: Not Currently     Partners: Female     Social Drivers of Health     Financial Resource Strain: High Risk (10/16/2024)    Received from [facility] SDOH Screening     In the past year, have you been unable to get any of the following when you really needed them? choose all that apply.: Medicine or health care   Housing Stability: High Risk (10/24/2024)    Received from [facility] SDOH Screening     In the past year, have you been unable to get any of the following when you really needed them? choose all that apply.: Utilities (electric, gas, and water)       Current Medications[1]    Review of patient's allergies indicates:  No Known Allergies               [1]   Current Outpatient Medications   Medication Sig Dispense Refill    allopurinoL (ZYLOPRIM) 100 MG tablet Take 100 mg by mouth once daily.      amLODIPine (NORVASC) 5 MG tablet Take 5 mg by mouth once daily.      aspirin (ECOTRIN) 81 MG EC tablet Take 81 mg by mouth once daily.       dorzolamide-timolol 2-0.5% (COSOPT) 22.3-6.8 mg/mL ophthalmic solution Place into both eyes.      EDARBYCLOR 40-12.5 mg Tab Take 1 tablet by mouth once daily.      EFFER-K disintegrating tablet Take 10 mEq by mouth.      gabapentin (NEURONTIN) 100 MG capsule Take 1 capsule (100 mg total) by mouth 2 (two) times daily. 60 capsule 11    gabapentin (NEURONTIN) 300 MG capsule Take 1 capsule (300 mg total) by mouth every evening. 30 capsule 5    latanoprost 0.005 % ophthalmic solution Place into both eyes.      LIDOcaine (LIDODERM) 5 % Place 1 patch onto the skin once daily. Remove & Discard patch within 12 hours or as directed by MD 30 patch 3    methocarbamoL (ROBAXIN) 500 MG Tab Take 500 mg by mouth 2 (two) times daily.      omeprazole (PRILOSEC) 40 MG capsule Take 40 mg by mouth 2 (two) times daily.      ondansetron (ZOFRAN) 4 MG tablet Take 1 tablet (4 mg total) by mouth every 6 (six) hours. 12 tablet 0    potassium chloride (MICRO-K) 10 MEQ CpSR Take 10 mEq by mouth.      pravastatin (PRAVACHOL) 40 MG tablet Take 40 mg by mouth once daily.      SITagliptin phosphate (JANUVIA) 100 MG Tab Take 100 mg by mouth once daily.      acyclovir (ZOVIRAX) 800 MG Tab Take 1 tablet (800 mg total) by mouth 5 (five) times daily. for 10 days 50 tablet 0    methIMAzole (TAPAZOLE) 5 MG Tab Take 1 tablet (5 mg total) by mouth once daily. 90 tablet 1     No current facility-administered medications for this visit.

## 2025-06-18 NOTE — H&P (VIEW-ONLY)
[facility]    Subjective:     Chief Complaint: Results (3 week f/u MRI results C/O pain level 6, Taking pain meds states helping)    Referred by: No ref. provider found     History of Present Illness: Meseret Mathis is a 75 y.o. female presents today as a three-week follow-up to go over MRI lumbar spine results and plan of care additionally, during last office visit with me patient was given an additional gabapentin 100 mg by mouth to take twice a day and she would continue taking her gabapentin 300 mg at bedtime for sleep and pain relief.  Patient also states her pain medications are helping.  Her pain score is a 6/10 on the NRS.  Patient confirms her primary pain is now located to her left buttock and will radiate down her left lateral leg and left ankle.  During her exam she had a negative Oscar's and thigh thrust on the left and right.  Her pain is better when she is walking and sleeping on her right side.  Her pain elevates to a 10/10 if she lays down on her left side and she reports this feels like a sharp pain.  There pertinent findings in her MRI lumbar spine at L5-S1 that show endplate changes with broad-based disc osteophyte and moderate bilateral foraminal stenosis.  Her pain score today as a 6/10 on the NRS.  She her pain feels sharp in his worse with lying down on the left.  This will cause her pain to elevate to a 10/10 on the NRS.  Additionally she reports her pain improves with walking.  Additionally she has to sleep on her right side to offset her pain temporarily.  Patient also relayed that her pain moved from the right side now everything is on the left side.  She denies any new injuries.  She has no history of spinal surgery, pacemaker, defibrillator or spinal cord stimulator.  She is very interested in trying a left L5 +S1 transforaminal epidural steroid injection.  She will hold her 81 mg aspirin for 1 week and resume postop.        HPI during last office visit with me on  05/28/2025 included the following:    leg and hip pain.  She reports this is new pain and denies any injuries.  Her discomfort has always been on her right lower extremity to her ankle not her left.  She was last seen in clinic by my supervising physician in July of 2024.  This is my 1st time evaluating the patient in clinic..  Patient reports she has completed physical therapy for this pain as well for about a month with minimal to no pain relief.  She describes this pain as sharp and will elevate to a 9/10 on the NRS when she sits too long, walks too long and when she egress is into and out of bed.  Her pain will reduce slightly to a 6/10 on the NRS if she changes positions and she takes gabapentin 300 mg at bedtime; however, she is not receiving pain relief for this new left-sided hip and lateral leg discomfort.  She does not have an MRI of her lumbar spine in his interested in doing so at [facility].  In the past she has tried Tylenol No. 3 as well as Robaxin as needed that helped somewhat but she is not taking this anymore.  Her pain score today as 9/10 on the NRS.  She is an independent ambulator.  She is agreeable to follow back up in 3 weeks after her MRI lumbar spine as complete to go over the results and plan of care for her new left hip and left lateral leg pain.  She has no history of spinal surgeries         HPI During last office visit with my supervising physician, Dr. Conteh on 07/29/2024 included the following:    This is a 74-year-old female who returns to clinic today for follow up of low back pain with radiation down the right lower extremity to her ankle.  She denies any radiation down the left lower extremity.  She also does not have any numbness or tingling in the lower extremities.  At her last appointment here, I continued her on gabapentin as previously prescribed.  She only takes it at bedtime because it makes her a little drowsy during the day.  However, she does get good relief from the  "medication.  She has some pain she 1st wakes up in the morning, but it goes away once she starts moving around.  She is still doing exercises and stretches at home that she learned from physical therapy.  Her pain level stays around 6 or 7/10 on the NRS.     Pertinent PMH:  Hyperlipidemia, hypertension, thyroid disease  Pertinent PSH:  No surgical history is on file, no pacemaker or defibrillator or metal in her body.          Vital signs:   Visit Vitals  /69 (BP Location: Right arm, Patient Position: Sitting)   Pulse 78   Ht 5' 5" (1.651 m)   Wt 79.8 kg (176 lb)   LMP  (LMP Unknown)   BMI 29.29 kg/m²      Vitals:    06/18/25 1425   PainSc:   6     Pain Disability Index (PDI): 56       Interventional Pain History  None    ROS: Left buttock, left lateral leg discomfort.    MRI lumbar spine 2025:  FINDINGS:  Straightening of the normal lumbar lordosis.  Incidental small benign intraosseous hemangioma of L3 vertebral body.  No spondylolisthesis.  No aggressive marrow signal.  No acute fracture.  No intrinsic abnormality of the distal cord or cauda equina.  No acute or significant paraspinous soft tissue abnormality.  Small incidental left renal superior pole cortical cyst.     Congenitally short pedicles with superimposed multilevel lumbar degeneration as follows:     T12-L1: Mild disc degeneration with disc bulge causing mild central canal stenosis.  No significant foraminal stenosis.     L1-L2: Moderate disc degeneration, mild Modic type 1 endplate change, disc bulge, and mild bilateral facet joint degeneration causing mild central canal stenosis and mild bilateral foraminal stenosis.     L2-L3: Moderate disc degeneration with Modic type 1 endplate changes, disc bulge, and mild bilateral facet joint degeneration causing mild to moderate central canal stenosis and mild-to-moderate bilateral foraminal stenosis.     L3-L4: Moderate disc degeneration, Modic type 1 endplate changes, disc bulge, and marginal " osteophyte causing mild-to-moderate central canal stenosis and moderate bilateral foraminal stenosis.     L4-L5: Moderate disc degeneration, Modic type 2 endplate changes, broad-based disc osteophyte with superimposed superiorly migrating central disc extrusion, and mild right facet joint degeneration which causes mild central canal stenosis and moderate bilateral foraminal stenosis.     L5-S1: Moderate disc degeneration, Modic type 1 and type 2 endplate changes with broad-based disc osteophyte causing mild bilateral subarticular recess stenosis and moderate bilateral foraminal stenosis.  Central canal is patent.     Impression:     Straightening of the normal lumbar lordosis, congenitally short pedicles, and significant multilevel spondylosis as described on a level by level basis.        Electronically signed by:Abdiel Yanez  Date:                                            06/11/2025  Time:                                           10:23        Objective:        Physical Exam  Mental Status: Oriented to person, palce and time. Displays appropriate mood & affect.  Head: Norm cephalic and atraumatic  Neck:  No cervical paraspinal banding.  Full range of motion with lateral turning and cervical flexion +extension.  Eyes: normal conjunctiva, normal lids, normal pupils  ENT and mouth: normal external ear, nose, and no lesions noted on the lips.  Respiratory: Symmetrical, Unlabored. No dyspnea  CV: normal rhythm and rate. No peripheral edema.   Abdomen: Non-distended     Extremities:  Gen: No cyanosis or tenderness to palpation bilateral upper and lower extremities  Skin: Warm, pink, dry, no rashes, no lesions on the lumbar spine  Strength: 5/5 motor strength bilateral upper and lower extremities  ROM: Full ROM in bilateral knees and ankles without pain or instability.     Neuro:  Gait: no altalgic lean, normal toe and heel raise. Independent ambulator.  DTR's: 2+ in bilateral patellar,  Sensory: Intact to light touch  bilateral  upper and lower extremities     Spine: Normal lordosis. No scoliosis  L-spine ROM:  Limited and painful on left ROM to flexion, extension, bilateral rotation,   Straight Leg Raise:  , positive left, negative right.  SI Joint: No tenderness to palpation bilaterally.  Negative Oscar's and thigh thrust bilaterally.      Assessment:       Meseret Mathis is a 75 y.o. female presents today as a three-week follow-up to go over MRI lumbar spine results and plan of care additionally, during last office visit with me patient was given an additional gabapentin 100 mg by mouth to take twice a day and she would continue taking her gabapentin 300 mg at bedtime for sleep and pain relief.  Patient also states her pain medications are helping.  Her pain score is a 6/10 on the NRS.  Patient confirms her primary pain is now located to her left buttock and will radiate down her left lateral leg and left ankle.  During her exam she had a negative Oscar's and thigh thrust on the left and right.  Her pain is better when she is walking and sleeping on her right side.  Her pain elevates to a 10/10 if she lays down on her left side and she reports this feels like a sharp pain.  There pertinent findings in her MRI lumbar spine at L5-S1 that show endplate changes with broad-based disc osteophyte and moderate bilateral foraminal stenosis.  Her pain score today as a 6/10 on the NRS.  She her pain feels sharp in his worse with lying down on the left.  This will cause her pain to elevate to a 10/10 on the NRS.  Additionally she reports her pain improves with walking.  Additionally she has to sleep on her right side to offset her pain temporarily.  Patient also relayed that her pain moved from the right side now everything is on the left side.  She denies any new injuries.  She has no history of spinal surgery, pacemaker, defibrillator or spinal cord stimulator.  She is very interested in trying a left L5 +S1 transforaminal epidural  steroid injection.  She will hold her 81 mg aspirin for 1 week and resume postop.    Plan of care:   Request sent for left L5 +S1 transforaminal epidural steroid injection   Hold aspirin 81 mg 1 week prior to procedure and resume postop.    Encounter Diagnoses   Name Primary?    Lumbar radiculopathy Yes    Chronic back pain greater than 3 months duration          Plan:       Meseret was seen today for results.    Diagnoses and all orders for this visit:    Lumbar radiculopathy    Chronic back pain greater than 3 months duration           Past Medical History:   Diagnosis Date    Hyperlipidemia     Hypertension     Thyroid disease        History reviewed. No pertinent surgical history.    No family history on file.    Social History     Socioeconomic History    Marital status:    Tobacco Use    Smoking status: Never    Smokeless tobacco: Never   Substance and Sexual Activity    Alcohol use: Not Currently    Drug use: Never    Sexual activity: Not Currently     Partners: Female     Social Drivers of Health     Financial Resource Strain: High Risk (10/16/2024)    Received from [facility] SDOH Screening     In the past year, have you been unable to get any of the following when you really needed them? choose all that apply.: Medicine or health care   Housing Stability: High Risk (10/24/2024)    Received from [facility] SDOH Screening     In the past year, have you been unable to get any of the following when you really needed them? choose all that apply.: Utilities (electric, gas, and water)       Current Medications[1]    Review of patient's allergies indicates:  No Known Allergies               [1]   Current Outpatient Medications   Medication Sig Dispense Refill    allopurinoL (ZYLOPRIM) 100 MG tablet Take 100 mg by mouth once daily.      amLODIPine (NORVASC) 5 MG tablet Take 5 mg by mouth once daily.      aspirin (ECOTRIN) 81 MG EC tablet Take 81 mg by mouth once daily.       dorzolamide-timolol 2-0.5% (COSOPT) 22.3-6.8 mg/mL ophthalmic solution Place into both eyes.      EDARBYCLOR 40-12.5 mg Tab Take 1 tablet by mouth once daily.      EFFER-K disintegrating tablet Take 10 mEq by mouth.      gabapentin (NEURONTIN) 100 MG capsule Take 1 capsule (100 mg total) by mouth 2 (two) times daily. 60 capsule 11    gabapentin (NEURONTIN) 300 MG capsule Take 1 capsule (300 mg total) by mouth every evening. 30 capsule 5    latanoprost 0.005 % ophthalmic solution Place into both eyes.      LIDOcaine (LIDODERM) 5 % Place 1 patch onto the skin once daily. Remove & Discard patch within 12 hours or as directed by MD 30 patch 3    methocarbamoL (ROBAXIN) 500 MG Tab Take 500 mg by mouth 2 (two) times daily.      omeprazole (PRILOSEC) 40 MG capsule Take 40 mg by mouth 2 (two) times daily.      ondansetron (ZOFRAN) 4 MG tablet Take 1 tablet (4 mg total) by mouth every 6 (six) hours. 12 tablet 0    potassium chloride (MICRO-K) 10 MEQ CpSR Take 10 mEq by mouth.      pravastatin (PRAVACHOL) 40 MG tablet Take 40 mg by mouth once daily.      SITagliptin phosphate (JANUVIA) 100 MG Tab Take 100 mg by mouth once daily.      acyclovir (ZOVIRAX) 800 MG Tab Take 1 tablet (800 mg total) by mouth 5 (five) times daily. for 10 days 50 tablet 0    methIMAzole (TAPAZOLE) 5 MG Tab Take 1 tablet (5 mg total) by mouth once daily. 90 tablet 1     No current facility-administered medications for this visit.

## 2025-06-30 ENCOUNTER — HOSPITAL ENCOUNTER (OUTPATIENT)
Facility: HOSPITAL | Age: 75
Discharge: HOME | End: 2025-06-30
Attending: ANESTHESIOLOGY | Admitting: ANESTHESIOLOGY
Payer: MEDICARE

## 2025-06-30 DIAGNOSIS — G89.29 CHRONIC BACK PAIN GREATER THAN 3 MONTHS DURATION: Primary | ICD-10-CM

## 2025-06-30 DIAGNOSIS — M54.9 CHRONIC BACK PAIN GREATER THAN 3 MONTHS DURATION: Primary | ICD-10-CM

## 2025-06-30 PROCEDURE — 64483 NJX AA&/STRD TFRM EPI L/S 1: CPT | Mod: LT,,, | Performed by: ANESTHESIOLOGY

## 2025-06-30 PROCEDURE — 64483 NJX AA&/STRD TFRM EPI L/S 1: CPT | Mod: LT | Performed by: ANESTHESIOLOGY

## 2025-06-30 PROCEDURE — 63600175 PHARM REV CODE 636 W HCPCS: Performed by: ANESTHESIOLOGY

## 2025-06-30 PROCEDURE — 25000003 PHARM REV CODE 250: Performed by: ANESTHESIOLOGY

## 2025-06-30 RX ORDER — DIAZEPAM 5 MG/1
10 TABLET ORAL ONCE
Status: COMPLETED | OUTPATIENT
Start: 2025-06-30 | End: 2025-06-30

## 2025-06-30 RX ORDER — BUPIVACAINE HYDROCHLORIDE 2.5 MG/ML
INJECTION, SOLUTION EPIDURAL; INFILTRATION; INTRACAUDAL; PERINEURAL
Status: DISCONTINUED | OUTPATIENT
Start: 2025-06-30 | End: 2025-06-30 | Stop reason: HOSPADM

## 2025-06-30 RX ORDER — LIDOCAINE HYDROCHLORIDE 10 MG/ML
INJECTION, SOLUTION EPIDURAL; INFILTRATION; INTRACAUDAL; PERINEURAL
Status: DISCONTINUED
Start: 2025-06-30 | End: 2025-06-30 | Stop reason: HOSPADM

## 2025-06-30 RX ORDER — LIDOCAINE HYDROCHLORIDE 10 MG/ML
INJECTION, SOLUTION EPIDURAL; INFILTRATION; INTRACAUDAL; PERINEURAL
Status: DISCONTINUED | OUTPATIENT
Start: 2025-06-30 | End: 2025-06-30 | Stop reason: HOSPADM

## 2025-06-30 RX ORDER — DEXAMETHASONE SODIUM PHOSPHATE 10 MG/ML
INJECTION, SOLUTION INTRA-ARTICULAR; INTRALESIONAL; INTRAMUSCULAR; INTRAVENOUS; SOFT TISSUE
Status: DISCONTINUED | OUTPATIENT
Start: 2025-06-30 | End: 2025-06-30 | Stop reason: HOSPADM

## 2025-06-30 RX ORDER — BUPIVACAINE HYDROCHLORIDE 2.5 MG/ML
INJECTION, SOLUTION EPIDURAL; INFILTRATION; INTRACAUDAL; PERINEURAL
Status: DISCONTINUED
Start: 2025-06-30 | End: 2025-06-30 | Stop reason: HOSPADM

## 2025-06-30 RX ORDER — DEXAMETHASONE SODIUM PHOSPHATE 10 MG/ML
INJECTION, SOLUTION INTRA-ARTICULAR; INTRALESIONAL; INTRAMUSCULAR; INTRAVENOUS; SOFT TISSUE
Status: DISCONTINUED
Start: 2025-06-30 | End: 2025-06-30 | Stop reason: HOSPADM

## 2025-06-30 RX ADMIN — DIAZEPAM 10 MG: 5 TABLET ORAL at 11:06

## 2025-06-30 NOTE — OP NOTE
Procedure:  Left L5 transforaminal epidural steroid injection    Left S1 transforaminal epidural steroid injection    Pre-Procedure Diagnoses:  Chronic back pain greater than 3 months  Lumbar degenerative disc disease  Lumbar radiculopathy  Lumbar disc displacement    Post-Procedure Diagnoses:  Chronic back pain greater than 3 months  Lumbar degenerative disc disease  Lumbar radiculopathy  Lumbar disc displacement    Anesthesia:  Local    Estimated Blood Loss:  < 2 ML    Consent:  The procedure, risks, benefits, and alternatives were discussed with the patient.  The patient voiced understanding and fully informed written consent was obtained.    Description of the Procedure:  The patient was taken to the operating room and placed in the prone position. The skin overlying the lumbar spine was prepped with Chloraprep and draped in the usual sterile fashion.  An oblique fluoroscopic view was obtained on the left side at L5, with the superior articular process of the sacral ala aligned with the pedicle.  Skin anesthesia was achieved using 2 mL of lidocaine 1%.  A 22-gauge 5 -inch Quinke spinal needle was inserted and advanced under intermittent fluoroscopic views into the epidural space. Proper needle position was confirmed under AP, oblique, and lateral fluoroscopic views.  Negative aspiration for blood or CSF was confirmed. 1 mL of contrast was injected, which revealed spread into the epidural space.  Then a combination of 5 mg of dexamethasone with 1 mL of 0.25% bupivacaine was easily injected.   There was no pain on injection. The needle was removed intact and bleeding was nil.  The same procedure was repeated in identical fashion on the left side at S1 .  Sterile bandages were applied. The patient was taken to the recovery room for further observation in stable condition. The patient was then discharged home with no complications.

## 2025-07-01 VITALS
BODY MASS INDEX: 29.02 KG/M2 | TEMPERATURE: 98 F | DIASTOLIC BLOOD PRESSURE: 66 MMHG | HEIGHT: 65 IN | OXYGEN SATURATION: 98 % | HEART RATE: 72 BPM | RESPIRATION RATE: 16 BRPM | WEIGHT: 174.19 LBS | SYSTOLIC BLOOD PRESSURE: 111 MMHG

## 2025-07-14 ENCOUNTER — OFFICE VISIT (OUTPATIENT)
Facility: CLINIC | Age: 75
End: 2025-07-14
Payer: MEDICARE

## 2025-07-14 VITALS
DIASTOLIC BLOOD PRESSURE: 62 MMHG | HEIGHT: 65 IN | HEART RATE: 84 BPM | WEIGHT: 176 LBS | SYSTOLIC BLOOD PRESSURE: 95 MMHG | BODY MASS INDEX: 29.32 KG/M2

## 2025-07-14 DIAGNOSIS — G89.29 CHRONIC BACK PAIN GREATER THAN 3 MONTHS DURATION: ICD-10-CM

## 2025-07-14 DIAGNOSIS — M54.9 CHRONIC BACK PAIN GREATER THAN 3 MONTHS DURATION: ICD-10-CM

## 2025-07-14 DIAGNOSIS — M54.16 LUMBAR RADICULOPATHY: Primary | ICD-10-CM

## 2025-07-14 PROCEDURE — 3074F SYST BP LT 130 MM HG: CPT | Mod: CPTII,,, | Performed by: NURSE PRACTITIONER

## 2025-07-14 PROCEDURE — 1101F PT FALLS ASSESS-DOCD LE1/YR: CPT | Mod: CPTII,,, | Performed by: NURSE PRACTITIONER

## 2025-07-14 PROCEDURE — 3044F HG A1C LEVEL LT 7.0%: CPT | Mod: CPTII,,, | Performed by: NURSE PRACTITIONER

## 2025-07-14 PROCEDURE — 3288F FALL RISK ASSESSMENT DOCD: CPT | Mod: CPTII,,, | Performed by: NURSE PRACTITIONER

## 2025-07-14 PROCEDURE — 1160F RVW MEDS BY RX/DR IN RCRD: CPT | Mod: CPTII,,, | Performed by: NURSE PRACTITIONER

## 2025-07-14 PROCEDURE — 3008F BODY MASS INDEX DOCD: CPT | Mod: CPTII,,, | Performed by: NURSE PRACTITIONER

## 2025-07-14 PROCEDURE — 99214 OFFICE O/P EST MOD 30 MIN: CPT | Mod: ,,, | Performed by: NURSE PRACTITIONER

## 2025-07-14 PROCEDURE — 1159F MED LIST DOCD IN RCRD: CPT | Mod: CPTII,,, | Performed by: NURSE PRACTITIONER

## 2025-07-14 PROCEDURE — 1125F AMNT PAIN NOTED PAIN PRSNT: CPT | Mod: CPTII,,, | Performed by: NURSE PRACTITIONER

## 2025-07-14 PROCEDURE — 3078F DIAST BP <80 MM HG: CPT | Mod: CPTII,,, | Performed by: NURSE PRACTITIONER

## 2025-07-14 RX ORDER — METHIMAZOLE 5 MG/1
5 TABLET ORAL
COMMUNITY
Start: 2025-07-12

## 2025-07-14 NOTE — PROGRESS NOTES
[facility]    Subjective:     Chief Complaint: Post-op Evaluation (Post op procedure 6/30/25 C/O pain level 6, states pain is not as bad but still having pain Taking Gabapentin for pain )    Referred by: No ref. provider found     History of Present Illness: Meseret Mathis is a pleasant 75-year-old established female patient presents as a postoperative follow-up for pain associated with lumbar radiculopathy and chronic back pain greater than three-month after receiving a left L5 +S1 transforaminal epidural steroid injection on 06/30/2025.  Overall she is satisfied with most of her pain relief however it has not completely taken the edge off.  She reports about a 50% reduction of pain since receiving this epidural steroid injection.  Prior to receiving the epidural steroid injection she relayed pain to her low back that radiates to her left buttock, left lateral leg and left ankle that was sharp.  She also relayed that she could not sleep on her left side or walk for prolonged periods of time without having severe pain elevate to a 1 6 or higher score/ 10 on the NRS on her left side.  Patient has continued taking gabapentin 100 mg in the morning, 100 mg at 4:00 p.m. and 300 mg at bedtime.  She is agreeable to increase the dose slightly to see if that can give her a little more pain relief.  She will follow-up with me in 2 months to re-evaluate her lumbar radiculopathy pain and re-evaluate her pain    History of Present Illness during last office visit with me on 06/18/2025 included the following:   : Meseret Mathis is a 75 y.o. female presents today as a three-week follow-up to go over MRI lumbar spine results and plan of care additionally, during last office visit with me patient was given an additional gabapentin 100 mg by mouth to take twice a day and she would continue taking her gabapentin 300 mg at bedtime for sleep and pain relief.  Patient also states her pain medications are helping.  Her pain  "score is a 6/10 on the NRS.  Patient confirms her primary pain is now located to her left buttock and will radiate down her left lateral leg and left ankle.  During her exam she had a negative Oscar's and thigh thrust on the left and right.  Her pain is better when she is walking and sleeping on her right side.  Her pain elevates to a 10/10 if she lays down on her left side and she reports this feels like a sharp pain.  There pertinent findings in her MRI lumbar spine at L5-S1 that show endplate changes with broad-based disc osteophyte and moderate bilateral foraminal stenosis.  Her pain score today as a 6/10 on the NRS.  She her pain feels sharp in his worse with lying down on the left.  This will cause her pain to elevate to a 10/10 on the NRS.  Additionally she reports her pain improves with walking.  Additionally she has to sleep on her right side to offset her pain temporarily.  Patient also relayed that her pain moved from the right side now everything is on the left side.  She denies any new injuries.  She has no history of spinal surgery, pacemaker, defibrillator or spinal cord stimulator.  She is very interested in trying a left L5 +S1 transforaminal epidural steroid injection.  She will hold her 81 mg aspirin for 1 week and resume postop.     Pertinent PMH:  Thyroid disease, hypertension, hyperlipidemia  Pertinent PSH:  No spinal surgeries, pacemaker, defibrillator or spinal cord stimulator, thyroid surgery, cholecystectomy,             Visit Vitals  BP 95/62 (BP Location: Left arm, Patient Position: Sitting)   Pulse 84   Ht 5' 5" (1.651 m)   Wt 79.8 kg (176 lb)   LMP  (LMP Unknown)   BMI 29.29 kg/m²      Vitals:    07/14/25 1313   PainSc:   6     Pain Disability Index (PDI): 35       Interventional Pain History  06/30/2025:  Left L5 +S1 TFESI (50% relief of pain)    ROS back and leg pain          MRI lumbar spine 2025:  FINDINGS:  Straightening of the normal lumbar lordosis.  Incidental small benign " intraosseous hemangioma of L3 vertebral body.  No spondylolisthesis.  No aggressive marrow signal.  No acute fracture.  No intrinsic abnormality of the distal cord or cauda equina.  No acute or significant paraspinous soft tissue abnormality.  Small incidental left renal superior pole cortical cyst.     Congenitally short pedicles with superimposed multilevel lumbar degeneration as follows:     T12-L1: Mild disc degeneration with disc bulge causing mild central canal stenosis.  No significant foraminal stenosis.     L1-L2: Moderate disc degeneration, mild Modic type 1 endplate change, disc bulge, and mild bilateral facet joint degeneration causing mild central canal stenosis and mild bilateral foraminal stenosis.     L2-L3: Moderate disc degeneration with Modic type 1 endplate changes, disc bulge, and mild bilateral facet joint degeneration causing mild to moderate central canal stenosis and mild-to-moderate bilateral foraminal stenosis.     L3-L4: Moderate disc degeneration, Modic type 1 endplate changes, disc bulge, and marginal osteophyte causing mild-to-moderate central canal stenosis and moderate bilateral foraminal stenosis.     L4-L5: Moderate disc degeneration, Modic type 2 endplate changes, broad-based disc osteophyte with superimposed superiorly migrating central disc extrusion, and mild right facet joint degeneration which causes mild central canal stenosis and moderate bilateral foraminal stenosis.     L5-S1: Moderate disc degeneration, Modic type 1 and type 2 endplate changes with broad-based disc osteophyte causing mild bilateral subarticular recess stenosis and moderate bilateral foraminal stenosis.  Central canal is patent.     Impression:     Straightening of the normal lumbar lordosis, congenitally short pedicles, and significant multilevel spondylosis as described on a level by level basis.        Electronically signed by:Abdiel Yanez  Date:                                             06/11/2025  Time:                                           10:23           Objective:        Physical Exam    Mental Status: Oriented to person, palce and time. Displays appropriate mood & affect.  Head: Norm cephalic and atraumatic  Neck:  No cervical paraspinal banding.  Full range of motion with lateral turning and cervical flexion +extension.  Eyes: normal conjunctiva, normal lids, normal pupils  ENT and mouth: normal external ear, nose, and no lesions noted on the lips.  Respiratory: Symmetrical, Unlabored. No dyspnea  CV: normal rhythm and rate. No peripheral edema.   Abdomen: Non-distended     Extremities:  Gen: No cyanosis or tenderness to palpation bilateral upper and lower extremities  Skin: Warm, pink, dry, no rashes, no lesions on the lumbar spine  Strength: 5/5 motor strength bilateral upper and lower extremities  ROM: Full ROM in bilateral knees and ankles without pain or instability.     Neuro:  Gait: no altalgic lean, normal toe and heel raise. Independent ambulator.  DTR's: 2+ in bilateral patellar,  Sensory: Intact to light touch bilateral  upper and lower extremities     Spine: Normal lordosis. No scoliosis  L-spine ROM:  Limited and painful on left ROM to flexion, extension, bilateral rotation,   Straight Leg Raise:  , positive left, negative right.  SI Joint: No tenderness to palpation bilaterally.  Negative Oscar's and thigh thrust bilaterally.         Assessment:      Meseret Mathis is a pleasant 75-year-old established female patient presents as a postoperative follow-up for pain associated with lumbar radiculopathy and chronic back pain greater than three-month after receiving a left L5 +S1 transforaminal epidural steroid injection on 06/30/2025.  Overall she is satisfied with most of her pain relief however it has not completely taken the edge off.  She reports about a 50% reduction of pain since receiving this epidural steroid injection.  Prior to receiving the epidural steroid injection  she relayed pain to her low back that radiates to her left buttock, left lateral leg and left ankle that was sharp.  She also relayed that she could not sleep on her left side or walk for prolonged periods of time without having severe pain elevate to a 1 6 or higher score/ 10 on the NRS on her left side.  Patient has continued taking gabapentin 100 mg in the morning, 100 mg at 4:00 p.m. and 300 mg at bedtime.  She is agreeable to increase the dose slightly to see if that can give her a little more pain relief.  She will follow-up with me in 2 months to re-evaluate her lumbar radiculopathy pain and re-evaluate her pain          Plan of care:   Patient to follow-up with me in 2 months to re-evaluate her lumbar radiculopathy pain on the left.  Patient will increase her gabapentin dose in the morning to 200 mg and increase her 4:00 p.m. dose to 200 mg.  Additionally she will continue taking 300 mg gabapentin at bedtime.  If this is still not effective additional adjustments can be discussed with the patient.    Encounter Diagnoses   Name Primary?    Lumbar radiculopathy Yes    Chronic back pain greater than 3 months duration          Plan:       Meseret was seen today for post-op evaluation.    Diagnoses and all orders for this visit:    Lumbar radiculopathy    Chronic back pain greater than 3 months duration           Past Medical History:   Diagnosis Date    Hyperlipidemia     Hypertension     Thyroid disease        Past Surgical History:   Procedure Laterality Date    CHOLECYSTECTOMY      INJECTION, SPINE, LUMBOSACRAL, TRANSFORAMINAL APPROACH Left 6/30/2025    Procedure: INJECTION, SPINE, LUMBOSACRAL, TRANSFORAMINAL APPROACH / TFESI Left L5 & S1;  Surgeon: Elizabeth Conteh MD;  Location: [facility];  Service: Pain Management;  Laterality: Left;  TFESI Left L5 & S1    THYROID SURGERY Right        No family history on file.    Social History     Socioeconomic History    Marital status:    Tobacco Use    Smoking  status: Never    Smokeless tobacco: Never   Vaping Use    Vaping status: Never Used   Substance and Sexual Activity    Alcohol use: Not Currently    Drug use: Never    Sexual activity: Not Currently     Partners: Female     Social Drivers of Health     Financial Resource Strain: High Risk (10/16/2024)    Received from [facility] SDOH Screening     In the past year, have you been unable to get any of the following when you really needed them? choose all that apply.: Medicine or health care   Housing Stability: High Risk (10/24/2024)    Received from [facility] SDOH Screening     In the past year, have you been unable to get any of the following when you really needed them? choose all that apply.: Utilities (electric, gas, and water)       Current Medications[1]    Review of patient's allergies indicates:  No Known Allergies               [1]   Current Outpatient Medications   Medication Sig Dispense Refill    allopurinoL (ZYLOPRIM) 100 MG tablet Take 100 mg by mouth once daily.      amLODIPine (NORVASC) 5 MG tablet Take 5 mg by mouth once daily.      aspirin (ECOTRIN) 81 MG EC tablet Take 81 mg by mouth once daily.      dorzolamide-timolol 2-0.5% (COSOPT) 22.3-6.8 mg/mL ophthalmic solution Place into both eyes.      EDARBYCLOR 40-12.5 mg Tab Take 1 tablet by mouth once daily.      gabapentin (NEURONTIN) 100 MG capsule Take 1 capsule (100 mg total) by mouth 2 (two) times daily. 60 capsule 11    gabapentin (NEURONTIN) 300 MG capsule Take 1 capsule (300 mg total) by mouth every evening. 30 capsule 5    latanoprost 0.005 % ophthalmic solution Place into both eyes.      methIMAzole (TAPAZOLE) 5 MG Tab Take 5 mg by mouth.      methocarbamoL (ROBAXIN) 500 MG Tab Take 500 mg by mouth 2 (two) times daily.      omeprazole (PRILOSEC) 40 MG capsule Take 40 mg by mouth 2 (two) times daily.      ondansetron (ZOFRAN) 4 MG tablet Take 1 tablet (4 mg total) by mouth every 6 (six) hours. 12 tablet 0     potassium chloride (MICRO-K) 10 MEQ CpSR Take 10 mEq by mouth.      pravastatin (PRAVACHOL) 40 MG tablet Take 40 mg by mouth once daily.      SITagliptin phosphate (JANUVIA) 100 MG Tab Take 100 mg by mouth once daily.      acyclovir (ZOVIRAX) 800 MG Tab Take 1 tablet (800 mg total) by mouth 5 (five) times daily. for 10 days 50 tablet 0     No current facility-administered medications for this visit.

## 2025-07-21 ENCOUNTER — TELEPHONE (OUTPATIENT)
Facility: CLINIC | Age: 75
End: 2025-07-21
Payer: MEDICARE

## 2025-07-21 NOTE — TELEPHONE ENCOUNTER
Let patient know the order was placed to her pharmacy on file.  Just to recap I want her to take gabapentin 200 mg twice during the day and 300 mg gabapentin at bedtime.

## 2025-07-21 NOTE — TELEPHONE ENCOUNTER
Mary pt contacted office requesting you to send rx of gabapentin to pharmacy on file states this was discussed at her last office visit pharmacy did not get revised rx, please advise thanks

## 2025-08-25 ENCOUNTER — TELEPHONE (OUTPATIENT)
Facility: CLINIC | Age: 75
End: 2025-08-25

## 2025-08-25 ENCOUNTER — OFFICE VISIT (OUTPATIENT)
Facility: CLINIC | Age: 75
End: 2025-08-25
Payer: MEDICARE

## 2025-08-25 VITALS
WEIGHT: 176 LBS | HEART RATE: 67 BPM | OXYGEN SATURATION: 98 % | BODY MASS INDEX: 29.32 KG/M2 | DIASTOLIC BLOOD PRESSURE: 78 MMHG | SYSTOLIC BLOOD PRESSURE: 124 MMHG | HEIGHT: 65 IN

## 2025-08-25 DIAGNOSIS — M54.16 LUMBAR RADICULOPATHY: Primary | ICD-10-CM

## 2025-08-25 DIAGNOSIS — G89.29 CHRONIC BACK PAIN GREATER THAN 3 MONTHS DURATION: ICD-10-CM

## 2025-08-25 DIAGNOSIS — M54.9 CHRONIC BACK PAIN GREATER THAN 3 MONTHS DURATION: ICD-10-CM

## 2025-08-25 PROCEDURE — 1159F MED LIST DOCD IN RCRD: CPT | Mod: CPTII,,, | Performed by: NURSE PRACTITIONER

## 2025-08-25 PROCEDURE — 99214 OFFICE O/P EST MOD 30 MIN: CPT | Mod: ,,, | Performed by: NURSE PRACTITIONER

## 2025-08-25 PROCEDURE — 3044F HG A1C LEVEL LT 7.0%: CPT | Mod: CPTII,,, | Performed by: NURSE PRACTITIONER

## 2025-08-25 PROCEDURE — 1125F AMNT PAIN NOTED PAIN PRSNT: CPT | Mod: CPTII,,, | Performed by: NURSE PRACTITIONER

## 2025-08-25 PROCEDURE — 1160F RVW MEDS BY RX/DR IN RCRD: CPT | Mod: CPTII,,, | Performed by: NURSE PRACTITIONER

## 2025-08-25 PROCEDURE — 1101F PT FALLS ASSESS-DOCD LE1/YR: CPT | Mod: CPTII,,, | Performed by: NURSE PRACTITIONER

## 2025-08-25 PROCEDURE — 3074F SYST BP LT 130 MM HG: CPT | Mod: CPTII,,, | Performed by: NURSE PRACTITIONER

## 2025-08-25 PROCEDURE — 3008F BODY MASS INDEX DOCD: CPT | Mod: CPTII,,, | Performed by: NURSE PRACTITIONER

## 2025-08-25 PROCEDURE — 3078F DIAST BP <80 MM HG: CPT | Mod: CPTII,,, | Performed by: NURSE PRACTITIONER

## 2025-08-25 PROCEDURE — 3288F FALL RISK ASSESSMENT DOCD: CPT | Mod: CPTII,,, | Performed by: NURSE PRACTITIONER

## 2025-08-25 RX ORDER — OLMESARTAN MEDOXOMIL 40 MG/1
40 TABLET ORAL DAILY
COMMUNITY

## (undated) DEVICE — ADAPTER DUAL NSL LUER M-M 7FT

## (undated) DEVICE — CONTRAST ISOVUE M 200 20ML VIL

## (undated) DEVICE — DRAPE UTILITY W/ TAPE 20X30IN

## (undated) DEVICE — GLOVE SIGNATURE MICRO LTX 6.5

## (undated) DEVICE — NDL FLTR 5MCRN BLNT TIP 18GX1

## (undated) DEVICE — Device

## (undated) DEVICE — CHLORAPREP 10.5 ML APPLICATOR

## (undated) DEVICE — NDL SYR 10ML 18X1.5 LL BLUNT

## (undated) DEVICE — NDL QUINCKE S/SU 22GA 5IN

## (undated) DEVICE — DRAPE MEDIUM SHEET 40X70IN

## (undated) DEVICE — SET SMARTSITE EXT SMALLBORE NF

## (undated) DEVICE — SYR 3ML LL 18GA 1.5IN

## (undated) DEVICE — NDL HYPO REG 25G X 1 1/2